# Patient Record
Sex: MALE | Race: WHITE | NOT HISPANIC OR LATINO | Employment: FULL TIME | URBAN - NONMETROPOLITAN AREA
[De-identification: names, ages, dates, MRNs, and addresses within clinical notes are randomized per-mention and may not be internally consistent; named-entity substitution may affect disease eponyms.]

---

## 2023-02-27 ENCOUNTER — TELEPHONE (OUTPATIENT)
Dept: PSYCHIATRY | Facility: CLINIC | Age: 28
End: 2023-02-27

## 2023-02-27 NOTE — TELEPHONE ENCOUNTER
Pt called asking for an apt because he is sucidule.  Informed pt that he need to go the the ER.  Pt decline still wanting an apt. So made an apt with Maile. Then pt asked to talk to Morteza.  Then Morteza transferred him back up here to schedule an apt with Radha.

## 2023-03-27 ENCOUNTER — OFFICE VISIT (OUTPATIENT)
Dept: PSYCHIATRY | Facility: CLINIC | Age: 28
End: 2023-03-27
Payer: COMMERCIAL

## 2023-03-27 DIAGNOSIS — F90.2 ATTENTION DEFICIT HYPERACTIVITY DISORDER, COMBINED TYPE: ICD-10-CM

## 2023-03-27 DIAGNOSIS — F31.81 BIPOLAR II DISORDER: Primary | ICD-10-CM

## 2023-03-27 DIAGNOSIS — F41.1 GENERALIZED ANXIETY DISORDER: ICD-10-CM

## 2023-03-27 PROCEDURE — 90791 PSYCH DIAGNOSTIC EVALUATION: CPT | Performed by: SOCIAL WORKER
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
## 2023-04-14 NOTE — PROGRESS NOTES
"Subjective   Lloyd Espana is a 27 y.o. male who presents today for initial evaluation     Chief Complaint:  Bipolar disorder, ADHD    History of Present Illness:   History of Present Illness  Today is an initial evaluation. He has long history of mental health treatment beginning at age 12 previously diagnosed with Asperger's syndrome, ADHD, anxiety and depression as well.. Currently taking wellbutrin  mg twice a day, prazosin 5 mg at night, Resalti 4 mg in am, Buspirone 30 mg twice a day, Klonopin 2 mg as needed, he reports he only takes it once or twice a month, clonidine 0.3 mg at night, Lamictal 25 mg twice a day. States has continued anxiety and depression. He was seen [facility], he started Lamictal about 2 months ago. Previous psychiatric hospitalizations: No Previous self harm behaviors: No. States he has had thoughts of harming himself and \"dreams\". Denies SI/HI/AVH.  Denies thoughts of self-harm. Prior medications include Lexapro, Vyvanse, Adderall (increases depression). He states he recently moved here, from [place], he was told he had bipoilar disorder type 2 when he was in [place].  He denies any risky behaviors including, drug or alcohol use, gambling, he enjoys shopping (even window shopping), denies risky sexual behaviors. He works at [employer] in [place], on leave of absence, for increased anxiety and depression. He states he is on short term disability until 5/10/23. He has previous diagnosis of cerebral palsy, he has been on light duty, he peels stickers \"all day\", increased depression, didn't want to be there, leaving work early. He is trying to find another job. Sleep is poor, he awakens during the night, but he has been sleeping mostly of the day, due to  working night shift, getting about 4 to 5 hours, denies nightmares, states Prazosin has controlled the nightmares. He states with sleeping during the daytime, he doesn't sleep through the night. Appetite is increased. " He overeats. He states he goes through periods of times when he doesn't eat much, but he states he mostly wants to eat all of the time. He states he doesn't feel like he feels emotions, difficulty crying. Denies SI/HI/AVH.  Denies thoughts of self-harm. He denies plan or intent to harm himself. Denies other health issues. Chronic health issues, no acute physical or medical issues today. He has one best friend in [place], is in contact every weekly. He has friends in [place] that he feels close to. He states it is scary when he has thoughts of self harm. Again, he denies any current thoughts of self harm, plan or intent to harm himself. He recently got his dog from [place], which he thinks has helped his moods, he got his spouse a 4 month old miniature schnauzer.Chronic health issues, no acute physical or medical issues today       The following portions of the patient's history were reviewed and updated as appropriate: allergies, current medications, past family history, past medical history, past social history, past surgical history and problem list.      Past Medical History:  Past Medical History:   Diagnosis Date   • ADHD (attention deficit hyperactivity disorder)    • Depression    • PTSD (post-traumatic stress disorder)        Social History:  Social History     Socioeconomic History   • Marital status:    Tobacco Use   • Smoking status: Never   Substance and Sexual Activity   • Alcohol use: Never   • Drug use: Never   • Sexual activity: Yes     Partners: Male     Birth control/protection: None       Family History:  Family History   Problem Relation Age of Onset   • Anxiety disorder Mother    • Depression Mother    • Depression Father        Past Surgical History:  History reviewed. No pertinent surgical history.    Problem List:  There is no problem list on file for this patient.      Allergy:   Allergies   Allergen Reactions   • Meloxicam Headache     migraines   • Doxycycline Rash        Current  "Medications:   Current Outpatient Medications   Medication Sig Dispense Refill   • Brexpiprazole (Rexulti) 4 MG tablet Take 1 tablet by mouth Daily. 30 tablet 1   • buPROPion XL (WELLBUTRIN XL) 300 MG 24 hr tablet Take 1 tablet by mouth Daily. 30 tablet 1   • busPIRone (BUSPAR) 30 MG tablet Take 1 tablet by mouth 2 (Two) Times a Day. 60 tablet 1   • cholecalciferol (VITAMIN D3) 1.25 MG (96766 UT) capsule Take 1 capsule by mouth Every 7 (Seven) Days. 4 capsule 11   • cloNIDine (CATAPRES) 0.2 MG tablet Take 1 tablet by mouth Every Night. 30 tablet 1   • hydrOXYzine (ATARAX) 25 MG tablet Take 1 tablet by mouth 3 (Three) Times a Day As Needed for Anxiety. 90 tablet 1   • lamoTRIgine (LaMICtal) 25 MG tablet Take 1 tablet by mouth 2 (Two) Times a Day. 60 tablet 1   • prazosin (MINIPRESS) 5 MG capsule Take 1 capsule by mouth Every Night. 30 capsule 1   • clonazePAM (KlonoPIN) 1 MG tablet Take 1 tablet by mouth Daily As Needed.     • ibuprofen (ADVIL,MOTRIN) 800 MG tablet Take 1 tablet by mouth Every 8 (Eight) Hours As Needed. for pain     • omeprazole (priLOSEC) 40 MG capsule        No current facility-administered medications for this visit.       Review of Symptoms:    Review of Systems   Neurological: Negative for seizures.   Psychiatric/Behavioral: Positive for dysphoric mood, sleep disturbance and depressed mood. Negative for agitation, behavioral problems, hallucinations, self-injury (No prior suicidal behaviors), suicidal ideas and stress. The patient is nervous/anxious.    All other systems reviewed and are negative.      Objective   Physical Exam:   Blood pressure 128/80, pulse 73, height 167.6 cm (66\"), weight 98.2 kg (216 lb 9.6 oz).  Body mass index is 34.96 kg/m².    Appearance:  male appears stated age, no acute distress noted.    Gait, Station, Strength: Steady, posture erect, WNL      Mental Status Exam: 4/17/23  Hygiene:   good  Cooperation:  Cooperative  Eye Contact:  Good  Psychomotor Behavior:  " Appropriate  Affect:  Appropriate  Mood: normal  Hopelessness: Denies  Speech:  Normal  Thought Process:  Linear  Thought Content:  Mood congruent  Suicidal:  None passive thoughts, denies plan or intent to harm himself  Homicidal:  None  Hallucinations:  None  Delusion:  None  Memory:  Intact  Orientation:  Person, Place, Time and Situation  Reliability:  fair  Insight:  Fair  Judgement:  Fair  Impulse Control:  Fair      PHQ-Score Total:  PHQ-9 Total Score: 12    Lab Results:   Lab on 04/17/2023   Component Date Value Ref Range Status   • Glucose 04/17/2023 81  65 - 99 mg/dL Final   • BUN 04/17/2023 17  6 - 20 mg/dL Final   • Creatinine 04/17/2023 1.04  0.76 - 1.27 mg/dL Final   • Sodium 04/17/2023 141  136 - 145 mmol/L Final   • Potassium 04/17/2023 4.3  3.5 - 5.2 mmol/L Final   • Chloride 04/17/2023 106  98 - 107 mmol/L Final   • CO2 04/17/2023 25.0  22.0 - 29.0 mmol/L Final   • Calcium 04/17/2023 9.1  8.6 - 10.5 mg/dL Final   • Total Protein 04/17/2023 7.1  6.0 - 8.5 g/dL Final   • Albumin 04/17/2023 4.6  3.5 - 5.2 g/dL Final   • ALT (SGPT) 04/17/2023 51 (H)  1 - 41 U/L Final   • AST (SGOT) 04/17/2023 27  1 - 40 U/L Final   • Alkaline Phosphatase 04/17/2023 67  39 - 117 U/L Final   • Total Bilirubin 04/17/2023 0.5  0.0 - 1.2 mg/dL Final   • Globulin 04/17/2023 2.5  gm/dL Final   • A/G Ratio 04/17/2023 1.8  g/dL Final   • BUN/Creatinine Ratio 04/17/2023 16.3  7.0 - 25.0 Final   • Anion Gap 04/17/2023 10.0  5.0 - 15.0 mmol/L Final   • eGFR 04/17/2023 101.6  >60.0 mL/min/1.73 Final   • Total Cholesterol 04/17/2023 253 (H)  0 - 200 mg/dL Final   • Triglycerides 04/17/2023 125  0 - 150 mg/dL Final   • HDL Cholesterol 04/17/2023 73 (H)  40 - 60 mg/dL Final   • LDL Cholesterol  04/17/2023 158 (H)  0 - 100 mg/dL Final   • VLDL Cholesterol 04/17/2023 22  5 - 40 mg/dL Final   • LDL/HDL Ratio 04/17/2023 2.12   Final   • TSH 04/17/2023 1.470  0.270 - 4.200 uIU/mL Final   • Free T4 04/17/2023 1.19  0.93 - 1.70 ng/dL Final    • Vitamin B-12 04/17/2023 439  211 - 946 pg/mL Final   • 25 Hydroxy, Vitamin D 04/17/2023 69.4  30.0 - 100.0 ng/ml Final   • WBC 04/17/2023 6.11  3.40 - 10.80 10*3/mm3 Final   • RBC 04/17/2023 5.79  4.14 - 5.80 10*6/mm3 Final   • Hemoglobin 04/17/2023 14.8  13.0 - 17.7 g/dL Final   • Hematocrit 04/17/2023 44.8  37.5 - 51.0 % Final   • MCV 04/17/2023 77.4 (L)  79.0 - 97.0 fL Final   • MCH 04/17/2023 25.6 (L)  26.6 - 33.0 pg Final   • MCHC 04/17/2023 33.0  31.5 - 35.7 g/dL Final   • RDW 04/17/2023 13.2  12.3 - 15.4 % Final   • RDW-SD 04/17/2023 36.8 (L)  37.0 - 54.0 fl Final   • MPV 04/17/2023 10.3  6.0 - 12.0 fL Final   • Platelets 04/17/2023 168  140 - 450 10*3/mm3 Final   • Neutrophil % 04/17/2023 52.9  42.7 - 76.0 % Final   • Lymphocyte % 04/17/2023 39.1  19.6 - 45.3 % Final   • Monocyte % 04/17/2023 7.2  5.0 - 12.0 % Final   • Eosinophil % 04/17/2023 0.3  0.3 - 6.2 % Final   • Basophil % 04/17/2023 0.2  0.0 - 1.5 % Final   • Immature Grans % 04/17/2023 0.3  0.0 - 0.5 % Final   • Neutrophils, Absolute 04/17/2023 3.23  1.70 - 7.00 10*3/mm3 Final   • Lymphocytes, Absolute 04/17/2023 2.39  0.70 - 3.10 10*3/mm3 Final   • Monocytes, Absolute 04/17/2023 0.44  0.10 - 0.90 10*3/mm3 Final   • Eosinophils, Absolute 04/17/2023 0.02  0.00 - 0.40 10*3/mm3 Final   • Basophils, Absolute 04/17/2023 0.01  0.00 - 0.20 10*3/mm3 Final   • Immature Grans, Absolute 04/17/2023 0.02  0.00 - 0.05 10*3/mm3 Final   • nRBC 04/17/2023 0.0  0.0 - 0.2 /100 WBC Final   Office Visit on 04/17/2023   Component Date Value Ref Range Status   • External Amphetamine Screen Urine 04/17/2023 Negative   Final   • External Benzodiazepine Screen Uri* 04/17/2023 Negative   Final   • External Cocaine Screen Urine 04/17/2023 Negative   Final   • External THC Screen Urine 04/17/2023 Negative   Final   • External Methadone Screen Urine 04/17/2023 Negative   Final   • External Methamphetamine Screen Ur* 04/17/2023 Negative   Final   • External Oxycodone Screen  Urine 04/17/2023 Negative   Final   • External Buprenorphine Screen Urine 04/17/2023 Negative   Final   • External MDMA 04/17/2023 Negative   Final   • External Opiates Screen Urine 04/17/2023 Negative   Final       Assessment & Plan   Problems Addressed this Visit    None  Visit Diagnoses     Bipolar II disorder    -  Primary    Relevant Medications    busPIRone (BUSPAR) 30 MG tablet    Brexpiprazole (Rexulti) 4 MG tablet    buPROPion XL (WELLBUTRIN XL) 300 MG 24 hr tablet    lamoTRIgine (LaMICtal) 25 MG tablet    hydrOXYzine (ATARAX) 25 MG tablet    Other Relevant Orders    CBC & Differential (Completed)    Comprehensive Metabolic Panel (Completed)    Lipid Panel (Completed)    TSH (Completed)    T4, Free (Completed)    Vitamin B12 (Completed)    Vitamin D,25-Hydroxy (Completed)    Attention deficit hyperactivity disorder, combined type        Relevant Medications    busPIRone (BUSPAR) 30 MG tablet    Brexpiprazole (Rexulti) 4 MG tablet    buPROPion XL (WELLBUTRIN XL) 300 MG 24 hr tablet    hydrOXYzine (ATARAX) 25 MG tablet    Other Relevant Orders    CBC & Differential (Completed)    Comprehensive Metabolic Panel (Completed)    Lipid Panel (Completed)    TSH (Completed)    T4, Free (Completed)    Vitamin B12 (Completed)    Vitamin D,25-Hydroxy (Completed)    Generalized anxiety disorder        Relevant Medications    cloNIDine (CATAPRES) 0.2 MG tablet    busPIRone (BUSPAR) 30 MG tablet    Brexpiprazole (Rexulti) 4 MG tablet    buPROPion XL (WELLBUTRIN XL) 300 MG 24 hr tablet    hydrOXYzine (ATARAX) 25 MG tablet    Other Relevant Orders    CBC & Differential (Completed)    Comprehensive Metabolic Panel (Completed)    Lipid Panel (Completed)    TSH (Completed)    T4, Free (Completed)    Vitamin B12 (Completed)    Vitamin D,25-Hydroxy (Completed)    Medication management        Relevant Orders    [facility] Drug Screen (Completed)    Nightmares        Relevant Medications    prazosin (MINIPRESS) 5 MG capsule     busPIRone (BUSPAR) 30 MG tablet    Brexpiprazole (Rexulti) 4 MG tablet    buPROPion XL (WELLBUTRIN XL) 300 MG 24 hr tablet    hydrOXYzine (ATARAX) 25 MG tablet      Diagnoses       Codes Comments    Bipolar II disorder    -  Primary ICD-10-CM: F31.81  ICD-9-CM: 296.89     Attention deficit hyperactivity disorder, combined type     ICD-10-CM: F90.2  ICD-9-CM: 314.01     Generalized anxiety disorder     ICD-10-CM: F41.1  ICD-9-CM: 300.02     Medication management     ICD-10-CM: Z79.899  ICD-9-CM: V58.69     Nightmares     ICD-10-CM: F51.5  ICD-9-CM: 307.47         Social History     Tobacco Use   Smoking Status Never   Smokeless Tobacco Not on file     MAINOR reviewed and appropriate. Patient counseled on use of controlled substances.       -The benefits of a healthy diet and exercise were discussed with patient, especially the positive effects they have on mental health. Patient encouraged to consider lifestyle modification regarding  diet and exercise patterns to maximize results of mental health treatment.  -Reviewed previous available documentation  -Reviewed most recent available labs     -This APRN has discussed that a very slow dose titration when starting, or changing doses, of lamotrigine may reduce the incidence of skin rash and other side effects.  The dosage should not be titrated upwards or increased faster than recommended due to the possibility of the discussed side effects and risk of development of a skin rash (which can become life threatening).  This APRN has also discussed that if the patient stops taking the lamotrigine for 5 days or longer, it will be necessary to restart the drug with an initial dose titration, as rashes have been reported on reexposure.  If the patient/guardian and Provider decide to stop the lamotrigine, the patient/guardian will follow the directions of this APRN/this office as a guided taper over about two weeks is appropriate due to the risk of relapse in bipolar disorder  with those with bipolar disorder, the risk of seizures in those with epilepsy, and discontinuation symptoms upon rapid discontinuation of lamotrigine. The patient/guardian verbalizes understanding of benefits and risks as discussed, the patient/guardian feels the benefits outweigh the risks and is agreeable to continue/take lamotrigine as discussed.  The patient/guardian is advised should any side effects or rash develops they are to stop the lamotrigine immediately and contact this APRN/this office or go to the emergency department immediately.  The patient/guardian verbalizes understanding and agreement with treatment plan in their own words.    -Prior medications include Lexapro, Vyvanse, Adderall (increases depression). Clonidine, lamictal, rexalti, wellbutrin, prazosin, buspar, klonopin.        Visit Diagnoses:    ICD-10-CM ICD-9-CM   1. Bipolar II disorder  F31.81 296.89   2. Attention deficit hyperactivity disorder, combined type  F90.2 314.01   3. Generalized anxiety disorder  F41.1 300.02   4. Medication management  Z79.899 V58.69   5. Nightmares  F51.5 307.47         TREATMENT PLAN/GOALS: Continue supportive psychotherapy efforts and medications as indicated. Treatment and medication options discussed during today's visit. Patient acknowledged and verbally consented to continue with current treatment plan and was educated on the importance of compliance with treatment and follow-up appointments.    MEDICATION ISSUES:  Discussed medication options and treatment plan of prescribed medication as well as the risks, benefits, and side effects including potential falls, possible impaired driving and metabolic adversities among others. Patient is agreeable to call the office with any worsening of symptoms or onset of side effects. Patient is agreeable to call 911 or go to the nearest ER should he/she begin having SI/HI.     MEDS ORDERED DURING VISIT:  New Medications Ordered This Visit   Medications   • prazosin  (MINIPRESS) 5 MG capsule     Sig: Take 1 capsule by mouth Every Night.     Dispense:  30 capsule     Refill:  1   • cloNIDine (CATAPRES) 0.2 MG tablet     Sig: Take 1 tablet by mouth Every Night.     Dispense:  30 tablet     Refill:  1   • busPIRone (BUSPAR) 30 MG tablet     Sig: Take 1 tablet by mouth 2 (Two) Times a Day.     Dispense:  60 tablet     Refill:  1   • Brexpiprazole (Rexulti) 4 MG tablet     Sig: Take 1 tablet by mouth Daily.     Dispense:  30 tablet     Refill:  1   • buPROPion XL (WELLBUTRIN XL) 300 MG 24 hr tablet     Sig: Take 1 tablet by mouth Daily.     Dispense:  30 tablet     Refill:  1   • lamoTRIgine (LaMICtal) 25 MG tablet     Sig: Take 1 tablet by mouth 2 (Two) Times a Day.     Dispense:  60 tablet     Refill:  1   • hydrOXYzine (ATARAX) 25 MG tablet     Sig: Take 1 tablet by mouth 3 (Three) Times a Day As Needed for Anxiety.     Dispense:  90 tablet     Refill:  1     Disregard previous hydroxyzine prescription       Return in about 2 months (around 6/17/2023).  -Continue buspirone 30 mg tablet take 1 tablet by mouth 2 times a day for anxiety  -Continue Rexulti 4 mg tablet take 1 tablet by mouth daily for depression and mood  -Change Wellbutrin to  mg 24-hour tablet take 1 tablet by mouth daily for depression  -Continue Lamictal 25 mg tablet take 1 tablet by mouth 2 times a day for mood  -Add hydroxyzine 25 mg tablet take 1 tablet up to 3 times a day as needed for anxiety  -Continue prazosin 5 mg capsule take 1 capsule at night for nightmares and PTSD  -Decrease clonidine 0.2 mg tablet take 1 tablet at night       Prognosis: Guarded dependent on medication/follow up and treatment plan compliance.  Functionality: pt showing improvements in important areas of daily functioning.     Short-term goals: Patient will adhere to medication regimen and note continued improvement in symptoms over the next 3 months.   Long-term goals: Patient will be adherent to medication management and  psychotherapy with continued improvement in symptoms over the next 6 months        This document has been electronically signed by PACHECO Ortiz   April 19, 2023 07:46 EDT    Part of this note may be an electronic transcription/translation of spoken language to printed text using the Dragon Dictation System.

## 2023-04-17 ENCOUNTER — LAB (OUTPATIENT)
Dept: LAB | Facility: HOSPITAL | Age: 28
End: 2023-04-17
Payer: COMMERCIAL

## 2023-04-17 ENCOUNTER — OFFICE VISIT (OUTPATIENT)
Dept: PSYCHIATRY | Facility: CLINIC | Age: 28
End: 2023-04-17
Payer: COMMERCIAL

## 2023-04-17 VITALS
SYSTOLIC BLOOD PRESSURE: 128 MMHG | BODY MASS INDEX: 34.81 KG/M2 | HEART RATE: 73 BPM | WEIGHT: 216.6 LBS | DIASTOLIC BLOOD PRESSURE: 80 MMHG | HEIGHT: 66 IN

## 2023-04-17 DIAGNOSIS — Z79.899 MEDICATION MANAGEMENT: ICD-10-CM

## 2023-04-17 DIAGNOSIS — F31.81 BIPOLAR II DISORDER: Primary | ICD-10-CM

## 2023-04-17 DIAGNOSIS — F41.1 GENERALIZED ANXIETY DISORDER: ICD-10-CM

## 2023-04-17 DIAGNOSIS — F90.2 ATTENTION DEFICIT HYPERACTIVITY DISORDER, COMBINED TYPE: ICD-10-CM

## 2023-04-17 DIAGNOSIS — F31.81 BIPOLAR II DISORDER: ICD-10-CM

## 2023-04-17 DIAGNOSIS — F51.5 NIGHTMARES: ICD-10-CM

## 2023-04-17 LAB
EXTERNAL AMPHETAMINE SCREEN URINE: NEGATIVE
EXTERNAL BENZODIAZEPINE SCREEN URINE: NEGATIVE
EXTERNAL BUPRENORPHINE SCREEN URINE: NEGATIVE
EXTERNAL COCAINE SCREEN URINE: NEGATIVE
EXTERNAL MDMA: NEGATIVE
EXTERNAL METHADONE SCREEN URINE: NEGATIVE
EXTERNAL METHAMPHETAMINE SCREEN URINE: NEGATIVE
EXTERNAL OPIATES SCREEN URINE: NEGATIVE
EXTERNAL OXYCODONE SCREEN URINE: NEGATIVE
EXTERNAL THC SCREEN URINE: NEGATIVE

## 2023-04-17 PROCEDURE — 84439 ASSAY OF FREE THYROXINE: CPT

## 2023-04-17 PROCEDURE — 80061 LIPID PANEL: CPT

## 2023-04-17 PROCEDURE — 82306 VITAMIN D 25 HYDROXY: CPT

## 2023-04-17 PROCEDURE — 82607 VITAMIN B-12: CPT

## 2023-04-17 PROCEDURE — 80050 GENERAL HEALTH PANEL: CPT

## 2023-04-17 RX ORDER — BUPROPION HYDROCHLORIDE 300 MG/1
300 TABLET ORAL DAILY
Qty: 30 TABLET | Refills: 1 | Status: SHIPPED | OUTPATIENT
Start: 2023-04-17

## 2023-04-17 RX ORDER — OMEPRAZOLE 40 MG/1
CAPSULE, DELAYED RELEASE ORAL
COMMUNITY
Start: 2023-02-15

## 2023-04-17 RX ORDER — PRAZOSIN HYDROCHLORIDE 5 MG/1
5 CAPSULE ORAL NIGHTLY
Qty: 30 CAPSULE | Refills: 1 | Status: SHIPPED | OUTPATIENT
Start: 2023-04-17

## 2023-04-17 RX ORDER — BUSPIRONE HYDROCHLORIDE 30 MG/1
30 TABLET ORAL 2 TIMES DAILY
Qty: 60 TABLET | Refills: 1 | Status: SHIPPED | OUTPATIENT
Start: 2023-04-17

## 2023-04-17 RX ORDER — HYDROXYZINE HYDROCHLORIDE 25 MG/1
25 TABLET, FILM COATED ORAL 3 TIMES DAILY PRN
Qty: 90 TABLET | Refills: 1 | Status: SHIPPED | OUTPATIENT
Start: 2023-04-17 | End: 2023-04-17

## 2023-04-17 RX ORDER — BUSPIRONE HYDROCHLORIDE 30 MG/1
TABLET ORAL
COMMUNITY
Start: 2023-03-14 | End: 2023-04-17 | Stop reason: SDUPTHER

## 2023-04-17 RX ORDER — BUPROPION HYDROCHLORIDE 150 MG/1
150 TABLET, EXTENDED RELEASE ORAL 2 TIMES DAILY
COMMUNITY
End: 2023-04-17

## 2023-04-17 RX ORDER — CLONIDINE HYDROCHLORIDE 0.2 MG/1
0.2 TABLET ORAL NIGHTLY
Qty: 30 TABLET | Refills: 1 | Status: SHIPPED | OUTPATIENT
Start: 2023-04-17

## 2023-04-17 RX ORDER — CLONIDINE HYDROCHLORIDE 0.3 MG/1
TABLET ORAL
COMMUNITY
Start: 2023-03-15 | End: 2023-04-17 | Stop reason: SDUPTHER

## 2023-04-17 RX ORDER — HYDROXYZINE HYDROCHLORIDE 25 MG/1
25 TABLET, FILM COATED ORAL 3 TIMES DAILY PRN
Qty: 90 TABLET | Refills: 1 | Status: SHIPPED | OUTPATIENT
Start: 2023-04-17

## 2023-04-17 RX ORDER — LAMOTRIGINE 25 MG/1
25 TABLET ORAL 2 TIMES DAILY
Qty: 60 TABLET | Refills: 1 | Status: SHIPPED | OUTPATIENT
Start: 2023-04-17

## 2023-04-17 RX ORDER — LAMOTRIGINE 25 MG/1
TABLET ORAL
COMMUNITY
Start: 2023-03-28 | End: 2023-04-17 | Stop reason: SDUPTHER

## 2023-04-17 RX ORDER — CLONAZEPAM 1 MG/1
1 TABLET ORAL DAILY PRN
COMMUNITY
Start: 2023-02-08

## 2023-04-17 RX ORDER — BREXPIPRAZOLE 4 MG/1
TABLET ORAL
COMMUNITY
Start: 2021-11-09 | End: 2023-04-17 | Stop reason: SDUPTHER

## 2023-04-17 RX ORDER — BREXPIPRAZOLE 4 MG/1
4 TABLET ORAL DAILY
Qty: 30 TABLET | Refills: 1 | Status: SHIPPED | OUTPATIENT
Start: 2023-04-17

## 2023-04-17 RX ORDER — PRAZOSIN HYDROCHLORIDE 5 MG/1
5 CAPSULE ORAL DAILY
COMMUNITY
Start: 2021-12-27 | End: 2023-04-17 | Stop reason: SDUPTHER

## 2023-04-17 RX ORDER — IBUPROFEN 800 MG/1
800 TABLET ORAL EVERY 8 HOURS PRN
COMMUNITY
Start: 2023-02-06

## 2023-04-17 RX ORDER — BUPROPION HYDROCHLORIDE 150 MG/1
150 TABLET ORAL DAILY
COMMUNITY
End: 2023-04-17 | Stop reason: SDUPTHER

## 2023-04-18 LAB
25(OH)D3 SERPL-MCNC: 69.4 NG/ML (ref 30–100)
ALBUMIN SERPL-MCNC: 4.6 G/DL (ref 3.5–5.2)
ALBUMIN/GLOB SERPL: 1.8 G/DL
ALP SERPL-CCNC: 67 U/L (ref 39–117)
ALT SERPL W P-5'-P-CCNC: 51 U/L (ref 1–41)
ANION GAP SERPL CALCULATED.3IONS-SCNC: 10 MMOL/L (ref 5–15)
AST SERPL-CCNC: 27 U/L (ref 1–40)
BASOPHILS # BLD AUTO: 0.01 10*3/MM3 (ref 0–0.2)
BASOPHILS NFR BLD AUTO: 0.2 % (ref 0–1.5)
BILIRUB SERPL-MCNC: 0.5 MG/DL (ref 0–1.2)
BUN SERPL-MCNC: 17 MG/DL (ref 6–20)
BUN/CREAT SERPL: 16.3 (ref 7–25)
CALCIUM SPEC-SCNC: 9.1 MG/DL (ref 8.6–10.5)
CHLORIDE SERPL-SCNC: 106 MMOL/L (ref 98–107)
CHOLEST SERPL-MCNC: 253 MG/DL (ref 0–200)
CO2 SERPL-SCNC: 25 MMOL/L (ref 22–29)
CREAT SERPL-MCNC: 1.04 MG/DL (ref 0.76–1.27)
DEPRECATED RDW RBC AUTO: 36.8 FL (ref 37–54)
EGFRCR SERPLBLD CKD-EPI 2021: 101.6 ML/MIN/1.73
EOSINOPHIL # BLD AUTO: 0.02 10*3/MM3 (ref 0–0.4)
EOSINOPHIL NFR BLD AUTO: 0.3 % (ref 0.3–6.2)
ERYTHROCYTE [DISTWIDTH] IN BLOOD BY AUTOMATED COUNT: 13.2 % (ref 12.3–15.4)
GLOBULIN UR ELPH-MCNC: 2.5 GM/DL
GLUCOSE SERPL-MCNC: 81 MG/DL (ref 65–99)
HCT VFR BLD AUTO: 44.8 % (ref 37.5–51)
HDLC SERPL-MCNC: 73 MG/DL (ref 40–60)
HGB BLD-MCNC: 14.8 G/DL (ref 13–17.7)
IMM GRANULOCYTES # BLD AUTO: 0.02 10*3/MM3 (ref 0–0.05)
IMM GRANULOCYTES NFR BLD AUTO: 0.3 % (ref 0–0.5)
LDLC SERPL CALC-MCNC: 158 MG/DL (ref 0–100)
LDLC/HDLC SERPL: 2.12 {RATIO}
LYMPHOCYTES # BLD AUTO: 2.39 10*3/MM3 (ref 0.7–3.1)
LYMPHOCYTES NFR BLD AUTO: 39.1 % (ref 19.6–45.3)
MCH RBC QN AUTO: 25.6 PG (ref 26.6–33)
MCHC RBC AUTO-ENTMCNC: 33 G/DL (ref 31.5–35.7)
MCV RBC AUTO: 77.4 FL (ref 79–97)
MONOCYTES # BLD AUTO: 0.44 10*3/MM3 (ref 0.1–0.9)
MONOCYTES NFR BLD AUTO: 7.2 % (ref 5–12)
NEUTROPHILS NFR BLD AUTO: 3.23 10*3/MM3 (ref 1.7–7)
NEUTROPHILS NFR BLD AUTO: 52.9 % (ref 42.7–76)
NRBC BLD AUTO-RTO: 0 /100 WBC (ref 0–0.2)
PLATELET # BLD AUTO: 168 10*3/MM3 (ref 140–450)
PMV BLD AUTO: 10.3 FL (ref 6–12)
POTASSIUM SERPL-SCNC: 4.3 MMOL/L (ref 3.5–5.2)
PROT SERPL-MCNC: 7.1 G/DL (ref 6–8.5)
RBC # BLD AUTO: 5.79 10*6/MM3 (ref 4.14–5.8)
SODIUM SERPL-SCNC: 141 MMOL/L (ref 136–145)
T4 FREE SERPL-MCNC: 1.19 NG/DL (ref 0.93–1.7)
TRIGL SERPL-MCNC: 125 MG/DL (ref 0–150)
TSH SERPL DL<=0.05 MIU/L-ACNC: 1.47 UIU/ML (ref 0.27–4.2)
VIT B12 BLD-MCNC: 439 PG/ML (ref 211–946)
VLDLC SERPL-MCNC: 22 MG/DL (ref 5–40)
WBC NRBC COR # BLD: 6.11 10*3/MM3 (ref 3.4–10.8)

## 2023-04-19 ENCOUNTER — TELEPHONE (OUTPATIENT)
Dept: PSYCHIATRY | Facility: CLINIC | Age: 28
End: 2023-04-19
Payer: COMMERCIAL

## 2023-04-19 NOTE — TELEPHONE ENCOUNTER
----- Message from PACHECO Ortiz sent at 4/19/2023  6:04 AM EDT -----  Would you notify patient, recommend patient f/u with PCP for further review of lab reports.     Thank you    ----- Message -----  From: Lab, Background User  Sent: 4/18/2023   2:31 AM EDT  To: PACHECO Ortiz

## 2023-04-19 NOTE — TELEPHONE ENCOUNTER
I did not get an answer or voicemail when attempting to contact patient. Letter was sent to address in chart.

## 2023-04-27 ENCOUNTER — TELEMEDICINE (OUTPATIENT)
Dept: PSYCHIATRY | Facility: CLINIC | Age: 28
End: 2023-04-27
Payer: COMMERCIAL

## 2023-04-27 DIAGNOSIS — F31.81 BIPOLAR II DISORDER: Primary | ICD-10-CM

## 2023-04-27 DIAGNOSIS — F90.2 ATTENTION DEFICIT HYPERACTIVITY DISORDER, COMBINED TYPE: ICD-10-CM

## 2023-04-27 DIAGNOSIS — F41.1 GENERALIZED ANXIETY DISORDER: ICD-10-CM

## 2023-04-27 PROCEDURE — 90834 PSYTX W PT 45 MINUTES: CPT | Performed by: SOCIAL WORKER

## 2023-04-27 NOTE — PROGRESS NOTES
Date: April 27, 2023  Time In: 12:08 pm  Time Out: 12:50 pm      This provider is located at [facility], [address] [place] [address]. The provider identified herself and credentials CELSO, COLBY . The Patient is seen remotely at home, using Epic Mychart. Patient is being seen via telehealth and stated they are in a secure environment for this session. The patient's condition being diagnosed/treated is appropriate for telemedicine. The provider identified himself as well as his credentials.   The patient gave consent to be seen remotely, and when consent is given they understand that the consent allows for patient identifiable information to be sent to a third party as needed.   They may refuse to be seen remotely at any time. The electronic data is encrypted and password protected, and the patient has been advised of the potential risks to privacy not withstanding such measures    PROGRESS NOTE    Chief Complaint: bipolar disorder, ADHD    Data:Lloyd is a 27 y.o., male who presents for individual therapy session at [facility]. Patient on time, clean and casually dressed  without evidence of intoxication, withdrawal, or perceptual disturbance.   Patient shares that he is lonely, noticing less energy when  is at work.  He shares feeling discouraged about his future, he is noticing no interest in things he used to enjoy.  Discusses that he feels worse when Yoel isnt around and feels alone, lonely.  The patient shares that their schedules don't allow them much time together.  He shares that financial concerns are also stressing him out. The patient shares that living on his own and having to be financially responsible is a huge stressor for him and he is trying to get things back on track without overspending. Has stopped the overspending and shopping.  Has noticed that he is eating out of boredom or going out to eat is something that he knows needs to work on. Patient shares  that his is worried that the accommodations are about to end and he is afraid that he wont be able to stand on his feet as long as will be required. Bipolar disorder more depressive symptoms no noticeable hypomania or manic symptoms.  Worries a lot, racing thougths at times  ADHD Adult inattention, low self-confidence, forgetfulness, avoiding mental tasks and fidgeting. Onset of symptoms was vague.  Symptoms are associated with financial burdens and lack of support.  Symptoms are aggravated by anxiety, lonely and stress.   Symptoms improve with medication management, therapy and personal self-care (wellness) Current rates severity of symptoms, on a scale of 1-10 (10 is the most severe) 7 Context Family and social history was reviewed  Quality been intermittent without a consistent pattern.    Clinical Maneuvering/Intervention:  Assisted patient in processing above session content; acknowledged and normalized patient’s thoughts, feelings, and concerns. Processing the above session content validating the patients expressions.  Exploring what the patient has been able to do in the past that assisted him to begin to problem solve and make a plan which he is able to do with therapeutic support. Introduced book ending and how to use to help him stay on task and not become over-whelemed.  While in the session the patient is walked through how to do this using the night stand which is view.  Patient is reminded to stay on 1 task at a time.  Continuing to build on the patients success with patient being able list the things that he needs to accomplish.  Explored physical activity verus laying in bed-noticing low motivation as a stop for the patient. Exploring eating patterns and   Therapist applied Brief Solutions Focused Therapy, CBT/REBT, Exploration of Coping Skills, Exploration of Emotions, Positive Coping Skills, Preventative Services and Structured Problem Solving and encouraged the patient to use positive coping  skills such as Playing with a pet, Spending time in nature, Reframe the way you are thinking about the problem, Self Care (Take care of your body in a way that makes you feel good - paint your nails, do your hair, put on a face mask), Time management (Work on managing your time better - for example, turn off the alerts on your phone), Establish healthy boundaries , Use positive self-talk, Keep a positive attitude, Take deep breaths, Keep calm by thinking and Utilize resources/coping skills.    Allowed patient to freely discuss issues without interruption or judgment. Provided safe, confidential environment to facilitate the development of positive therapeutic relationship and encourage open, honest communication. Assisted patient in identifying risk factors which would indicate the need for higher level of care including thoughts to harm self or others and/or self-harming behavior and encouraged patient to contact this office, call 911, or present to the nearest emergency room should any of these events occur. Discussed crisis intervention services and means to access. Patient adamantly and convincingly denies current suicidal or homicidal ideation or perceptual disturbance.    Assessment     Psychiatric/Behavioral: Negative for agitation, behavioral problems, decreased concentration, hallucinations, self-injury, sleep disturbance, suicidal ideas, negative for hyperactivity. Positive for dysphoric mood, depressed mood and stress. The patient is nervous/anxious.      Patient appears to maintain relative stability as compared to their baseline. However, patient continues to struggle with bipolar disorder, ADHD which continues to cause impairment in important areas of functioning. A result, they can be reasonably expected to continue to benefit from treatment and would likely be at increased risk for decompensation otherwise.    MENTAL STATUS EXAM   General Appearance:  Cleanly groomed and dressed  Eye Contact:  Good  "eye contact  Attitude:  Cooperative  Motor Activity:  Normal gait, posture  Muscle Strength:  Normal  Speech:  Normal rate, tone, volume  Language:  Spontaneous  Mood and affect:  Normal, pleasant  Hopelessness:  Denies  Thought Process:  Logical and goal-directed  Associations/ Thought Content:  No delusions  Hallucinations:  None  Suicidal Ideations:  Not present  Homicidal Ideation:  Not present  Sensorium:  Alert  Orientation:  Person, place, time and situation  Immediate Recall, Recent, and Remote Memory:  Intact  Attention Span/ Concentration:  Easily distracted  Fund of Knowledge:  Appropriate for age and educational level        Patient's Support Network Includes:   and extended family    Functional Status: Moderate impairment   STRENGTHS: Motivated for treatment, Literate, Good family support and Articulate  Overall: Anxious     WEAKNESSES: Poor social support and Poor coping skills  Progress toward goal: Not at goal    Prognosis: Guarded with Ongoing Treatment    Plan     PROGRESS TOWARD CURRENT PLAN OF CARE/TREATMENT PLAN:  Making Progress    SHORT-TERM GOALS: The patient will  will learn and practice at least 2 mood swing management techniques with goal of decreasing mood swings, will work with therapist to help expose and extinguish irrational beliefs and conclusions that contribute to anxiety/depression, will work with therapist to identify conflicts from the past and the present that form the basis, will initiate 2 or more social contacts per week for the next 4 weeks, will engage in one self-care activity daily, per self-report and will engage in one enjoyable activity daily, per self-report     LONG-TERM GOALS: With the help of therapy, I would like to: Remission of symptoms for 1 year and reports satisfaction with where he is at in his life.    Plan   Crisis Plan:  Symptoms and/or behaviors to indicate a crisis: Extreme mood changes; including uncontrollable \"highs\" or euphoria and Thinking " about suicide    What calming techniques or other strategies will patient use to de-esclate and stay safe: slow down, breathe, visualize calming self, think it though, listen to music, change focus, take a walk  Who is one person patient can contact to assist with de-escalation? [facility]    Crisis Management: the Patient will contact staff or crisis line if symptoms exacerbate or if harm to self or others becomes a concern. Crisis resources include: Crisis Line [phone], 911, Local Law Enforcement, [facility], [facility] 24/7 Emergency Room [phone].    Recommended Referrals: Psychiatrist/APRN and Medical Provider (PCP)  Patient will adhere to medication regimen as prescribed and report any side effects. Patient will contact this office, call 911 or present to the nearest emergency room should suicidal or homicidal ideations occur. Provide Cognitive Behavioral Therapy and Solution Focused Therapy to improve functioning, maintain stability, and avoid decompensation and the need for higher level of care.   Patient will continue in individual outpatient therapy with focus on improved functioning and coping skills, maintaining stability, and avoiding decompensation and the need for higher level of care.      Return in about 4 weeks, or earlier if symptoms worsen or fail to improve.    VISIT DIAGNOSIS:     ICD-10-CM ICD-9-CM   1. Bipolar II disorder  F31.81 296.89   2. Attention deficit hyperactivity disorder, combined type  F90.2 314.01   3. Generalized anxiety disorder  F41.1 300.02        Future Appointments       Provider Department Center    5/1/2023 2:00 PM Radha Francisco LCSW [facility]    6/19/2023 12:45 PM Maile Sanchez APRN [facility]            This document has been electronically signed by Radha Ball LCSW, COLBY   April 27, 2023 12:10 EDT      Part of this note may be an electronic  transcription/translation of spoken language to printed text using the Dragon Dictation System.

## 2023-05-01 ENCOUNTER — TELEMEDICINE (OUTPATIENT)
Dept: PSYCHIATRY | Facility: CLINIC | Age: 28
End: 2023-05-01
Payer: COMMERCIAL

## 2023-05-01 DIAGNOSIS — F90.2 ATTENTION DEFICIT HYPERACTIVITY DISORDER, COMBINED TYPE: ICD-10-CM

## 2023-05-01 DIAGNOSIS — F31.81 BIPOLAR II DISORDER: Primary | ICD-10-CM

## 2023-05-01 DIAGNOSIS — F41.1 GENERALIZED ANXIETY DISORDER: ICD-10-CM

## 2023-05-01 PROCEDURE — 90837 PSYTX W PT 60 MINUTES: CPT | Performed by: SOCIAL WORKER
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
## 2023-05-10 ENCOUNTER — OFFICE VISIT (OUTPATIENT)
Dept: PSYCHIATRY | Facility: CLINIC | Age: 28
End: 2023-05-10
Payer: COMMERCIAL

## 2023-05-10 DIAGNOSIS — F43.23 ADJUSTMENT DISORDER WITH MIXED ANXIETY AND DEPRESSED MOOD: Primary | ICD-10-CM
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
## 2023-05-15 ENCOUNTER — OFFICE VISIT (OUTPATIENT)
Dept: PSYCHIATRY | Facility: HOSPITAL | Age: 28
End: 2023-05-15
Payer: COMMERCIAL

## 2023-05-15 DIAGNOSIS — F43.10 POST TRAUMATIC STRESS DISORDER (PTSD): ICD-10-CM

## 2023-05-15 DIAGNOSIS — F33.2 SEVERE EPISODE OF RECURRENT MAJOR DEPRESSIVE DISORDER, WITHOUT PSYCHOTIC FEATURES: Primary | ICD-10-CM

## 2023-05-15 PROCEDURE — S9480 INTENSIVE OUTPATIENT PSYCHIA: HCPCS

## 2023-05-16 ENCOUNTER — OFFICE VISIT (OUTPATIENT)
Dept: PSYCHIATRY | Facility: HOSPITAL | Age: 28
End: 2023-05-16
Payer: COMMERCIAL

## 2023-05-16 DIAGNOSIS — F33.2 SEVERE EPISODE OF RECURRENT MAJOR DEPRESSIVE DISORDER, WITHOUT PSYCHOTIC FEATURES: Primary | ICD-10-CM

## 2023-05-16 DIAGNOSIS — F43.10 POST TRAUMATIC STRESS DISORDER (PTSD): ICD-10-CM

## 2023-05-16 PROCEDURE — S9480 INTENSIVE OUTPATIENT PSYCHIA: HCPCS

## 2023-05-17 ENCOUNTER — OFFICE VISIT (OUTPATIENT)
Dept: PSYCHIATRY | Facility: HOSPITAL | Age: 28
End: 2023-05-17
Payer: COMMERCIAL

## 2023-05-17 DIAGNOSIS — F43.10 POST TRAUMATIC STRESS DISORDER (PTSD): ICD-10-CM

## 2023-05-17 DIAGNOSIS — F33.2 SEVERE EPISODE OF RECURRENT MAJOR DEPRESSIVE DISORDER, WITHOUT PSYCHOTIC FEATURES: Primary | ICD-10-CM

## 2023-05-17 PROCEDURE — S9480 INTENSIVE OUTPATIENT PSYCHIA: HCPCS

## 2023-05-18 ENCOUNTER — OFFICE VISIT (OUTPATIENT)
Dept: PSYCHIATRY | Facility: HOSPITAL | Age: 28
End: 2023-05-18
Payer: COMMERCIAL

## 2023-05-18 DIAGNOSIS — F41.1 GENERALIZED ANXIETY DISORDER: ICD-10-CM

## 2023-05-18 DIAGNOSIS — F31.81 BIPOLAR II DISORDER: ICD-10-CM

## 2023-05-18 RX ORDER — LAMOTRIGINE 25 MG/1
50 TABLET ORAL 2 TIMES DAILY
Qty: 60 TABLET | Refills: 0 | Status: SHIPPED | OUTPATIENT
Start: 2023-05-18

## 2023-05-18 RX ORDER — BUPROPION HYDROCHLORIDE 300 MG/1
300 TABLET ORAL EVERY MORNING
Qty: 30 TABLET | Refills: 0 | Status: SHIPPED | OUTPATIENT
Start: 2023-05-18
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
## 2023-05-23 ENCOUNTER — OFFICE VISIT (OUTPATIENT)
Dept: PSYCHIATRY | Facility: HOSPITAL | Age: 28
End: 2023-05-23
Payer: COMMERCIAL

## 2023-05-23 DIAGNOSIS — F43.10 POST TRAUMATIC STRESS DISORDER (PTSD): ICD-10-CM

## 2023-05-23 DIAGNOSIS — F33.2 SEVERE EPISODE OF RECURRENT MAJOR DEPRESSIVE DISORDER, WITHOUT PSYCHOTIC FEATURES: Primary | ICD-10-CM

## 2023-05-23 PROCEDURE — S9480 INTENSIVE OUTPATIENT PSYCHIA: HCPCS

## 2023-05-24 ENCOUNTER — APPOINTMENT (OUTPATIENT)
Dept: PSYCHIATRY | Facility: HOSPITAL | Age: 28
End: 2023-05-24
Payer: COMMERCIAL

## 2023-05-24 DIAGNOSIS — F33.2 SEVERE EPISODE OF RECURRENT MAJOR DEPRESSIVE DISORDER, WITHOUT PSYCHOTIC FEATURES: Primary | ICD-10-CM

## 2023-05-24 DIAGNOSIS — F43.10 POST TRAUMATIC STRESS DISORDER (PTSD): ICD-10-CM

## 2023-05-24 PROCEDURE — S9480 INTENSIVE OUTPATIENT PSYCHIA: HCPCS

## 2023-05-24 NOTE — PROGRESS NOTES
NAME: Lloyd Espana  DATE: 05/15/2023    -- [facility] --      Time:   5104-9577        DATA:          Psychotherapy Group (Check-in):  Therapist asked that each patient share a summary of how they're doing, including progress towards therapy goals and any new stressors that may have occurred, as well as any items they wish to put on today's agenda. Therapist provided empathy and support.      Education/Training Group: Group members received education about suicide bereavement. Therapist encouraged group members to share their thoughts and discuss this topic with one another. Therapist continued facilitation of group cohesiveness.      Psychotherapy Group: This  group focused on further exploring topics mentioned at check ins. Patients are given opportunities to build interpersonal confidence, practice communication skills, and receive empathic understanding from others. Therapist encourages group members to share and respond to one another about their experience with this.     Patient Response:     Personal Assessment 0-10 Scale (10 worst)   Depression: 7  Anxiety: 7    Patient arrived in person and participated in therapy today. Patient shared that he had a stressful weekend due to having his step-son over the house. States that having the child around disrupted patient’s routine and frustrated patient. Patient is engaged during educational group session about suicide bereavement.     ASSESSMENT:    MSE within normal limits.     Impression/Formulation:    ICD-10-CM ICD-9-CM   1. Severe episode of recurrent major depressive disorder, without psychotic features  F33.2 296.33   2. Post traumatic stress disorder (PTSD)  F43.10 309.81       CLINICAL MANUVERING/INTERVENTIONS:  Therapist utilized a person-centered approach to build rapport with patient.  Therapist implemented motivational interviewing techniques to assist patient with exploring personal growth and change.   Therapist applied cognitive behavioral  strategies to facilitate identification of maladaptive patterns of thinking and behavior.  Therapist employed group interaction activities to build rapport among group members, promote healthy lifestyle, and emphasize improvement of symptoms.  Therapist promoted safe nonjudgmental environment by providing group members with unconditional positive regard and encouraging group members to comply with group rules and guidelines.  Therapist utilized dialectical behavior techniques to teach and model emotional regulation and relaxation.  Therapist assisted group member with identifying and implementing healthier coping strategies.  Group member was encouraged to make positive daily choices, and maintain healthy boundaries. Group format provides experiential learning of the benefit of sharing openly with others.     PLAN:  Continue [facility].  Aftercare:  Step down to outpatient level of care     This document signed by YUVAL Bennett, May 24, 2023, 11:52 EDT

## 2023-05-25 ENCOUNTER — APPOINTMENT (OUTPATIENT)
Dept: PSYCHIATRY | Facility: HOSPITAL | Age: 28
End: 2023-05-25
Payer: COMMERCIAL

## 2023-05-25 DIAGNOSIS — F33.2 SEVERE EPISODE OF RECURRENT MAJOR DEPRESSIVE DISORDER, WITHOUT PSYCHOTIC FEATURES: Primary | ICD-10-CM

## 2023-05-25 DIAGNOSIS — F43.10 POST TRAUMATIC STRESS DISORDER (PTSD): ICD-10-CM

## 2023-05-25 PROCEDURE — S9480 INTENSIVE OUTPATIENT PSYCHIA: HCPCS

## 2023-05-26 NOTE — PROGRESS NOTES
NAME: Lloyd Espana  DATE: 05/16/2023    -- [facility] --      Time:   5973-0333        DATA:          Psychotherapy Group (Check-in):  Therapist asked that each patient share a summary of how they're doing, including progress towards therapy goals and any new stressors that may have occurred, as well as any items they wish to put on today's agenda. Therapist provided empathy and support.      Psychotherapy Group: This  group focused on further exploring topics mentioned at check ins. Patients are given opportunities to build interpersonal confidence, practice communication skills, and receive empathic understanding from others. Therapist encourages group members to share and respond to one another about their experience with this.       Education/Training Group: Group members [facility] from [facility].      Patient Response:     Personal Assessment 0-10 Scale (10 worst)   Depression: 5  Anxiety: 9    Patient arrived in person and participated in therapy today. Patient shared that he is feeling anxious about his FMLA today, getting it in time. Patient is engaged in group conversations today.     ASSESSMENT:    MSE within normal limits.    Impression/Formulation:    ICD-10-CM ICD-9-CM   1. Severe episode of recurrent major depressive disorder, without psychotic features  F33.2 296.33   2. Post traumatic stress disorder (PTSD)  F43.10 309.81       CLINICAL MANUVERING/INTERVENTIONS:  Therapist utilized a person-centered approach to build rapport with patient.  Therapist implemented motivational interviewing techniques to assist patient with exploring personal growth and change.   Therapist applied cognitive behavioral strategies to facilitate identification of maladaptive patterns of thinking and behavior.  Therapist employed group interaction activities to build rapport among group members, promote healthy lifestyle, and emphasize improvement of symptoms.  Therapist promoted safe nonjudgmental  environment by providing group members with unconditional positive regard and encouraging group members to comply with group rules and guidelines.  Therapist utilized dialectical behavior techniques to teach and model emotional regulation and relaxation.  Therapist assisted group member with identifying and implementing healthier coping strategies.  Group member was encouraged to make positive daily choices, and maintain healthy boundaries. Group format provides experiential learning of the benefit of sharing openly with others.     PLAN:  Continue [facility].  Aftercare:  Step down to outpatient level of care     This document signed by YUVAL Bennett, May 26, 2023, 14:00 EDT

## 2023-05-26 NOTE — PROGRESS NOTES
NAME: Lloyd Espana  DATE: 05/17/2023    -- [facility] --     Time:   9567-9362      DATA:          Psychotherapy Group (Check-in):  Therapist asked that each patient share a summary of how they're doing, including progress towards therapy goals and any new stressors that may have occurred, as well as any items they wish to put on today's agenda. Therapist provided empathy and support.     Psychotherapy Group: This  group focused on further exploring topics mentioned at check ins. Patients are given opportunities to build interpersonal confidence, practice communication skills, and receive empathic understanding from others. Therapist encourages group members to share and respond to one another about their experience with this.      Activity Therapy Group: Group members received activity therapy from RT Rachel.     Patient Response:     Personal Assessment 0-10 Scale (10 worst)   Depression: 5  Anxiety: 6    Patient arrived in person and participated in therapy today. Patient shared reports feeling somewhat better but still feeling stressed. Patient relates to attachment-related anxiety that another group member shared about. Patient states that he often finds himself clinging to others by “trying to talk constantly.”      ASSESSMENT:    MSE within normal limits.     Impression/Formulation:    ICD-10-CM ICD-9-CM   1. Severe episode of recurrent major depressive disorder, without psychotic features  F33.2 296.33   2. Post traumatic stress disorder (PTSD)  F43.10 309.81       CLINICAL MANUVERING/INTERVENTIONS:  Therapist utilized a person-centered approach to build rapport with patient.  Therapist implemented motivational interviewing techniques to assist patient with exploring personal growth and change.   Therapist applied cognitive behavioral strategies to facilitate identification of maladaptive patterns of thinking and behavior.  Therapist employed group interaction activities to build rapport among  group members, promote healthy lifestyle, and emphasize improvement of symptoms.  Therapist promoted safe nonjudgmental environment by providing group members with unconditional positive regard and encouraging group members to comply with group rules and guidelines.  Therapist utilized dialectical behavior techniques to teach and model emotional regulation and relaxation.  Therapist assisted group member with identifying and implementing healthier coping strategies.  Group member was encouraged to make positive daily choices, and maintain healthy boundaries. Group format provides experiential learning of the benefit of sharing openly with others.     PLAN:  Continue [facility].  Aftercare:  Step down to outpatient level of care     This document signed by YUVAL Bennett, May 26, 2023, 15:05 EDT

## 2023-05-30 ENCOUNTER — OFFICE VISIT (OUTPATIENT)
Dept: PSYCHIATRY | Facility: HOSPITAL | Age: 28
End: 2023-05-30
Payer: COMMERCIAL

## 2023-05-30 DIAGNOSIS — F33.2 SEVERE EPISODE OF RECURRENT MAJOR DEPRESSIVE DISORDER, WITHOUT PSYCHOTIC FEATURES: Primary | ICD-10-CM

## 2023-05-30 DIAGNOSIS — F43.10 POST TRAUMATIC STRESS DISORDER (PTSD): ICD-10-CM

## 2023-05-30 PROCEDURE — S9480 INTENSIVE OUTPATIENT PSYCHIA: HCPCS

## 2023-05-31 ENCOUNTER — APPOINTMENT (OUTPATIENT)
Dept: PSYCHIATRY | Facility: HOSPITAL | Age: 28
End: 2023-05-31
Payer: COMMERCIAL

## 2023-06-01 ENCOUNTER — OFFICE VISIT (OUTPATIENT)
Dept: PSYCHIATRY | Facility: HOSPITAL | Age: 28
End: 2023-06-01
Payer: COMMERCIAL

## 2023-06-01 ENCOUNTER — OFFICE VISIT (OUTPATIENT)
Dept: PSYCHIATRY | Facility: CLINIC | Age: 28
End: 2023-06-01
Payer: COMMERCIAL

## 2023-06-01 DIAGNOSIS — F43.10 POST TRAUMATIC STRESS DISORDER (PTSD): ICD-10-CM

## 2023-06-01 DIAGNOSIS — F31.81 BIPOLAR II DISORDER: ICD-10-CM

## 2023-06-01 DIAGNOSIS — F41.1 GENERALIZED ANXIETY DISORDER: ICD-10-CM

## 2023-06-01 DIAGNOSIS — F33.2 SEVERE EPISODE OF RECURRENT MAJOR DEPRESSIVE DISORDER, WITHOUT PSYCHOTIC FEATURES: Primary | ICD-10-CM

## 2023-06-01 RX ORDER — LAMOTRIGINE 100 MG/1
50 TABLET ORAL 2 TIMES DAILY
Qty: 30 TABLET | Refills: 0 | Status: SHIPPED | OUTPATIENT
Start: 2023-06-01
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
## 2023-06-02 NOTE — PROGRESS NOTES
Adult PHP Group      Date: March 30, 2023    Time: 3:00 pm - 4:00 pm    Type of Group:  Psychoeducation    Group  Content: Gratitude exercises    Patient Response:Group participants received education on six different gratitude exercises.  Gratitude journaling, giving thanks, mindfulness walk, gratitude letter, grateful contemplation, and gratitude conversation.  Group participants discussed these different exercises and were encouraged to practice them in their daily lives.         Morteza Coleman  06/02/23  11:53 EDT

## 2023-06-02 NOTE — PROGRESS NOTES
NAME: Lloyd Espana  DATE: 05/23/2023    -- [facility] --      Time:   0720-7395        DATA:          Psychotherapy Group (Check-in):  Therapist asked that each patient share a summary of how they're doing, including progress towards therapy goals and any new stressors that may have occurred, as well as any items they wish to put on today's agenda. Therapist provided empathy and support.      Psychotherapy Group: This  group focused on further exploring topics mentioned at check ins. Patients are given opportunities to build interpersonal confidence, practice communication skills, and receive empathic understanding from others. Therapist encourages group members to share and respond to one another about their experience with this.      Psychotherapy Group: This  group focused on further exploring topics mentioned at check ins. Patients are given opportunities to build interpersonal confidence, practice communication skills, and receive empathic understanding from others. Therapist encourages group members to share and respond to one another about their experience with this.     Patient Response:     Personal Assessment 0-10 Scale (10 worst)   Depression: 4  Anxiety: 10    Patient arrived in person and participated in therapy today. Patient shared that he is feeling stressed. States that he had an intentional conversation with his spouse that went well. States that he wants to learn some skills for controlling his anger so he doesn’t yell at his spouse.     ASSESSMENT:    Impression/Formulation:    ICD-10-CM ICD-9-CM   1. Severe episode of recurrent major depressive disorder, without psychotic features  F33.2 296.33   2. Post traumatic stress disorder (PTSD)  F43.10 309.81       CLINICAL MANUVERING/INTERVENTIONS:  Therapist utilized a person-centered approach to build rapport with patient.  Therapist implemented motivational interviewing techniques to assist patient with exploring personal growth and change.    Therapist applied cognitive behavioral strategies to facilitate identification of maladaptive patterns of thinking and behavior.  Therapist employed group interaction activities to build rapport among group members, promote healthy lifestyle, and emphasize improvement of symptoms.  Therapist promoted safe nonjudgmental environment by providing group members with unconditional positive regard and encouraging group members to comply with group rules and guidelines.  Therapist utilized dialectical behavior techniques to teach and model emotional regulation and relaxation.  Therapist assisted group member with identifying and implementing healthier coping strategies.  Group member was encouraged to make positive daily choices, and maintain healthy boundaries. Group format provides experiential learning of the benefit of sharing openly with others.     PLAN:  Continue [facility].  Aftercare:  Step down to outpatient level of care     This document signed by YUVAL Bennett, June 2, 2023, 14:26 EDT

## 2023-06-05 ENCOUNTER — OFFICE VISIT (OUTPATIENT)
Dept: PSYCHIATRY | Facility: HOSPITAL | Age: 28
End: 2023-06-05
Payer: COMMERCIAL

## 2023-06-05 DIAGNOSIS — F43.10 POST TRAUMATIC STRESS DISORDER (PTSD): Primary | ICD-10-CM

## 2023-06-05 DIAGNOSIS — F41.1 GENERALIZED ANXIETY DISORDER: ICD-10-CM

## 2023-06-05 DIAGNOSIS — F31.81 BIPOLAR II DISORDER: ICD-10-CM

## 2023-06-05 PROCEDURE — S9480 INTENSIVE OUTPATIENT PSYCHIA: HCPCS

## 2023-06-05 NOTE — PROGRESS NOTES
NAME: Lloyd Espana  DATE: 05/24/2023    -- [facility] --      Time:   9182-2663        DATA:          Psychotherapy Group (Check-in):  Therapist asked that each patient share a summary of how they're doing, including progress towards therapy goals and any new stressors that may have occurred, as well as any items they wish to put on today's agenda. Therapist provided empathy and support.      Psychotherapy Group: This  group focused on further exploring topics mentioned at check ins. Patients are given opportunities to build interpersonal confidence, practice communication skills, and receive empathic understanding from others. Therapist encourages group members to share and respond to one another about their experience with this.      Activity Therapy Group: Group members received activity therapy from RT Rachel.     Patient Response:     Personal Assessment 0-10 Scale (10 worst)   Depression: 5  Anxiety: 5    Patient arrived in person and participated in therapy today. Patient shared that things seem to be going a bit better. Reports feeling hopeful because he was able to recover from a panic attack quicker than usual by using breathing exercise and grounding exercise he learned in [facility].     ASSESSMENT:        Impression/Formulation:    ICD-10-CM ICD-9-CM   1. Severe episode of recurrent major depressive disorder, without psychotic features  F33.2 296.33   2. Post traumatic stress disorder (PTSD)  F43.10 309.81       CLINICAL MANUVERING/INTERVENTIONS:  Therapist utilized a person-centered approach to build rapport with patient.  Therapist implemented motivational interviewing techniques to assist patient with exploring personal growth and change.   Therapist applied cognitive behavioral strategies to facilitate identification of maladaptive patterns of thinking and behavior.  Therapist employed group interaction activities to build rapport among group members, promote healthy lifestyle, and emphasize  improvement of symptoms.  Therapist promoted safe nonjudgmental environment by providing group members with unconditional positive regard and encouraging group members to comply with group rules and guidelines.  Therapist utilized dialectical behavior techniques to teach and model emotional regulation and relaxation.  Therapist assisted group member with identifying and implementing healthier coping strategies.  Group member was encouraged to make positive daily choices, and maintain healthy boundaries. Group format provides experiential learning of the benefit of sharing openly with others.     PLAN:  Continue [facility].  Aftercare:  Step down to outpatient level of care     This document signed by Chantelle Lux [facility], June 5, 2023, 11:00 EDT

## 2023-06-05 NOTE — PROGRESS NOTES
"     NAME: Lloyd Espana  DATE: 05/25/2023    -- [facility] --     Time:   1920-6374      DATA:          Psychotherapy Group (Check-in):  Therapist asked that each patient share a summary of how they're doing, including progress towards therapy goals and any new stressors that may have occurred, as well as any items they wish to put on today's agenda. Therapist provided empathy and support.     Education/Training Group: Group members received education about \"I statements\" as communication skills. Therapist encouraged group members to share their thoughts and discuss this topic with one another. Therapist continued facilitation of group cohesiveness.     Psychotherapy Group: This  group focused on further exploring topics mentioned at check ins. Patients are given opportunities to build interpersonal confidence, practice communication skills, and receive empathic understanding from others. Therapist encourages group members to share and respond to one another about their experience with this.     Patient Response:     Personal Assessment 0-10 Scale (10 worst)   Depression: 5  Anxiety: 8    Patient arrived in person and participated in therapy today. Patient shared that he is feeling better today. Patient gives advice to other group members a few times.     ASSESSMENT:    Impression/Formulation:    ICD-10-CM ICD-9-CM   1. Severe episode of recurrent major depressive disorder, without psychotic features  F33.2 296.33   2. Post traumatic stress disorder (PTSD)  F43.10 309.81       CLINICAL MANUVERING/INTERVENTIONS:  Therapist utilized a person-centered approach to build rapport with patient.  Therapist implemented motivational interviewing techniques to assist patient with exploring personal growth and change.   Therapist applied cognitive behavioral strategies to facilitate identification of maladaptive patterns of thinking and behavior.  Therapist employed group interaction activities to build rapport among group " members, promote healthy lifestyle, and emphasize improvement of symptoms.  Therapist promoted safe nonjudgmental environment by providing group members with unconditional positive regard and encouraging group members to comply with group rules and guidelines.  Therapist utilized dialectical behavior techniques to teach and model emotional regulation and relaxation.  Therapist assisted group member with identifying and implementing healthier coping strategies.  Group member was encouraged to make positive daily choices, and maintain healthy boundaries. Group format provides experiential learning of the benefit of sharing openly with others.     PLAN:  Continue [facility].  Aftercare:  Step down to outpatient level of care     This document signed by Chantelle Lux [facility], June 5, 2023, 12:38 EDT

## 2023-06-06 ENCOUNTER — OFFICE VISIT (OUTPATIENT)
Dept: PSYCHIATRY | Facility: HOSPITAL | Age: 28
End: 2023-06-06
Payer: COMMERCIAL

## 2023-06-06 DIAGNOSIS — F33.2 SEVERE EPISODE OF RECURRENT MAJOR DEPRESSIVE DISORDER, WITHOUT PSYCHOTIC FEATURES: Primary | ICD-10-CM

## 2023-06-06 DIAGNOSIS — F43.10 POST TRAUMATIC STRESS DISORDER (PTSD): ICD-10-CM

## 2023-06-06 PROCEDURE — S9480 INTENSIVE OUTPATIENT PSYCHIA: HCPCS

## 2023-06-07 ENCOUNTER — OFFICE VISIT (OUTPATIENT)
Dept: PSYCHIATRY | Facility: HOSPITAL | Age: 28
End: 2023-06-07
Payer: COMMERCIAL

## 2023-06-07 DIAGNOSIS — F41.1 GENERALIZED ANXIETY DISORDER: ICD-10-CM

## 2023-06-07 DIAGNOSIS — F43.10 POST TRAUMATIC STRESS DISORDER (PTSD): ICD-10-CM

## 2023-06-07 DIAGNOSIS — F33.2 SEVERE EPISODE OF RECURRENT MAJOR DEPRESSIVE DISORDER, WITHOUT PSYCHOTIC FEATURES: Primary | ICD-10-CM

## 2023-06-07 PROCEDURE — S9480 INTENSIVE OUTPATIENT PSYCHIA: HCPCS

## 2023-06-07 NOTE — PROGRESS NOTES
NAME: Lloyd Espana  DATE: 05/30/2023    -- [facility] --      Time:   0686-5891        DATA:          Psychotherapy Group (Check-in):  Therapist asked that each patient share a summary of how they're doing, including progress towards therapy goals and any new stressors that may have occurred, as well as any items they wish to put on today's agenda. Therapist provided empathy and support.      Psychotherapy Group: This  group focused on further exploring topics mentioned at check ins. Patients are given opportunities to build interpersonal confidence, practice communication skills, and receive empathic understanding from others. Therapist encourages group members to share and respond to one another about their experience with this.      Education/Training Group: Group members [facility].    Patient Response:     Personal Assessment 0-10 Scale (10 worst)   Depression: 8  Anxiety: 10    Patient arrived in person and participated in therapy today. Patient shared that he is feeling severe anxiety and depression today due to worrying because his work has yet to accept his paid leave. Patient processes this with group and therapist.     ASSESSMENT:      Impression/Formulation:    ICD-10-CM ICD-9-CM   1. Severe episode of recurrent major depressive disorder, without psychotic features  F33.2 296.33   2. Post traumatic stress disorder (PTSD)  F43.10 309.81       CLINICAL MANUVERING/INTERVENTIONS:  Therapist utilized a person-centered approach to build rapport with patient.  Therapist implemented motivational interviewing techniques to assist patient with exploring personal growth and change.   Therapist applied cognitive behavioral strategies to facilitate identification of maladaptive patterns of thinking and behavior.  Therapist employed group interaction activities to build rapport among group members, promote healthy lifestyle, and emphasize improvement of symptoms.  Therapist promoted  safe nonjudgmental environment by providing group members with unconditional positive regard and encouraging group members to comply with group rules and guidelines.  Therapist utilized dialectical behavior techniques to teach and model emotional regulation and relaxation.  Therapist assisted group member with identifying and implementing healthier coping strategies.  Group member was encouraged to make positive daily choices, and maintain healthy boundaries. Group format provides experiential learning of the benefit of sharing openly with others.     PLAN:  Continue [facility].  Aftercare:  Step down to outpatient level of care     This document signed by YUVAL Bennett, June 7, 2023, 10:15 EDT
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
## 2023-06-09 NOTE — PROGRESS NOTES
NAME: Lloyd Espana  DATE: 06/06/2023    -- [facility] --      Time:   5041-5032        DATA:          Psychotherapy Group (Check-in):  Therapist asked that each patient share a summary of how they're doing, including progress towards therapy goals and any new stressors that may have occurred, as well as any items they wish to put on today's agenda. Therapist provided empathy and support.      Education/Training Group: Group members received education about cognitive distortions. Therapist encouraged group members to share their thoughts and discuss this topic with one another. Therapist continued facilitation of group cohesiveness.      Education/Training Group: Group members received education from Morteza Coleman [facility].     Patient Response:      Patient arrived in person and participated in therapy today. Patient participated in educational group session about cognitive distortions. Patient practices social skills by responding to other people's shares.     ASSESSMENT:    Impression/Formulation:    ICD-10-CM ICD-9-CM   1. Severe episode of recurrent major depressive disorder, without psychotic features  F33.2 296.33   2. Post traumatic stress disorder (PTSD)  F43.10 309.81       CLINICAL MANUVERING/INTERVENTIONS:  Therapist utilized a person-centered approach to build rapport with patient.  Therapist implemented motivational interviewing techniques to assist patient with exploring personal growth and change.   Therapist applied cognitive behavioral strategies to facilitate identification of maladaptive patterns of thinking and behavior.  Therapist employed group interaction activities to build rapport among group members, promote healthy lifestyle, and emphasize improvement of symptoms.  Therapist promoted safe nonjudgmental environment by providing group members with unconditional positive regard and encouraging group members to comply with group rules and guidelines.  Therapist utilized dialectical behavior  techniques to teach and model emotional regulation and relaxation.  Therapist assisted group member with identifying and implementing healthier coping strategies.  Group member was encouraged to make positive daily choices, and maintain healthy boundaries. Group format provides experiential learning of the benefit of sharing openly with others.     PLAN:  Continue [facility].  Aftercare:  Step down to outpatient level of care     This document signed by Chantelle Lux [facility], June 9, 2023, 14:34 EDT

## 2023-06-09 NOTE — PROGRESS NOTES
NAME: Lloyd Espana  DATE: 06/05/2023    -- [facility] --      Time:   [id number]        DATA:          Psychotherapy Group (Check-in):  Therapist asked that each patient share a summary of how they're doing, including progress towards therapy goals and any new stressors that may have occurred, as well as any items they wish to put on today's agenda. Therapist provided empathy and support.      Education/Training Group: Group members received education about cognitive distortions. Therapist encouraged group members to share their thoughts and discuss this topic with one another. Therapist continued facilitation of group cohesiveness.      Psychotherapy Group: This  group focused on further exploring topics mentioned at check ins. Patients are given opportunities to build interpersonal confidence, practice communication skills, and receive empathic understanding from others. Therapist encourages group members to share and respond to one another about their experience with this.      Patient Response:     Personal Assessment 0-10 Scale (10 worst)   Depression: 9  Anxiety: 9    Patient arrived in person and participated in therapy today. Patient shared that he is very worried about his financial situation. States that he hasn't heard back from his employer about his taking a leave of absence. States that his anxiety is overwhelming today because he is worried that if he stops getting paid he won't be able to continue coming to [facility]. Therapist and group assisted patient in examining the helpfulness of this thought and processing his feelings.     ASSESSMENT:    Impression/Formulation:    ICD-10-CM ICD-9-CM   1. Post traumatic stress disorder (PTSD)  F43.10 309.81   2. Generalized anxiety disorder  F41.1 300.02   3. Bipolar II disorder  F31.81 296.89       CLINICAL MANUVERING/INTERVENTIONS:  Therapist utilized a person-centered approach to build rapport with patient.  Therapist implemented motivational interviewing  techniques to assist patient with exploring personal growth and change.   Therapist applied cognitive behavioral strategies to facilitate identification of maladaptive patterns of thinking and behavior.  Therapist employed group interaction activities to build rapport among group members, promote healthy lifestyle, and emphasize improvement of symptoms.  Therapist promoted safe nonjudgmental environment by providing group members with unconditional positive regard and encouraging group members to comply with group rules and guidelines.  Therapist utilized dialectical behavior techniques to teach and model emotional regulation and relaxation.  Therapist assisted group member with identifying and implementing healthier coping strategies.  Group member was encouraged to make positive daily choices, and maintain healthy boundaries. Group format provides experiential learning of the benefit of sharing openly with others.     PLAN:  Continue [facility].  Aftercare:  Step down to outpatient level of care     This document signed by Chantelle Lux [facility], June 9, 2023, 13:48 EDT

## 2023-06-12 ENCOUNTER — OFFICE VISIT (OUTPATIENT)
Dept: PSYCHIATRY | Facility: HOSPITAL | Age: 28
End: 2023-06-12
Payer: COMMERCIAL

## 2023-06-12 DIAGNOSIS — F43.10 POST TRAUMATIC STRESS DISORDER (PTSD): ICD-10-CM

## 2023-06-12 DIAGNOSIS — F33.2 SEVERE EPISODE OF RECURRENT MAJOR DEPRESSIVE DISORDER, WITHOUT PSYCHOTIC FEATURES: Primary | ICD-10-CM

## 2023-06-12 PROCEDURE — S9480 INTENSIVE OUTPATIENT PSYCHIA: HCPCS | Performed by: SOCIAL WORKER

## 2023-06-12 NOTE — PROGRESS NOTES
NAME: Lloyd Espana  DATE: 06/12/2023    [facility] Phase  NA  [id number]    Services Provided    Group Psychotherapy x 1  Education/Training x 2  Activity Therapy  x 0  Individual Therapy x 0 0 minutes  Family Therapy x 0  MD Initial Visit  x 0  MD Follow-Up   x 0    Number of participants: 9    DATA:  Patient reported this weekend had been stressful having his stepson with him. He had told the patient he was scared of him because he nagged too much. His stepson was 10 years old. He had only been trying to help him clean up after himself. The weekend, overall, was not bad. He and his  had taken his stepson to a friend's pool. They had also tried to help a friend with grocery shopping, but he had not wanted to buy anything because he didn't want to spend more money. This interaction had put him in a bad mood. He and his  had felt unappreciated.     Regarding his stepson, he had found himself having the primary responsibility of care and discipline while his  was away at work during the nighttime. Peers offered advice to which the patient was receptive.     Individual: No    Homework: None assigned    Group 1 (Psychotherapy: Check-ins): Therapist continued facilitation of rapport building strategies between group members. Therapist asked that each patient check in with home life and recovery efforts and identify triggers, cravings, and high risk situations that arise between group sessions.  Group members discussed barriers and benefits of attending recovery meetings.  Therapist provided empathy and support during group session. Daily Reading: None    Group 2  (CBT) Reviewed the cognitive model and went through an illustration in order to apply the model.     Group 3 (Psychoeducation) This hour was facilitated [facility].     ASSESSMENT:    Personal Assessment 0-10 Scale (10 worst)    Anxiety:  8 (no comment)  Depression:  5 (no comment)     MSE within normal limits? No Affect: dysthymic  Mood: depressed  Pt Response:  open/receptive  Engaged in activity/Process and self-disclosed: adequate  Applies today's group topics to self: adequate  Able to give and receive feedback: adequate  Demonstrated insightful thinking: consistent and adequate  Other pt response comments:  Patient was a positive participant. They demonstrated a relatively optimistic attitude, a moderate degree of motivation, and adequate insight, as evidenced by his level of engagement combined with his level of open-mindedness to suggestions from his peers. They seemed interested and attentive. They appeared to comprehend well the concepts being discussed. The patient seemed receptive of, and willing to implement, the ideas being discussed. Their thought process appeared linear and goal directed, and their speech was regular rate and rhythm.       .  No visits with results within 3 Week(s) from this visit.   Latest known visit with results is:   Lab on 04/17/2023   Component Date Value Ref Range Status    Glucose 04/17/2023 81  65 - 99 mg/dL Final    BUN 04/17/2023 17  6 - 20 mg/dL Final    Creatinine 04/17/2023 1.04  0.76 - 1.27 mg/dL Final    Sodium 04/17/2023 141  136 - 145 mmol/L Final    Potassium 04/17/2023 4.3  3.5 - 5.2 mmol/L Final    Chloride 04/17/2023 106  98 - 107 mmol/L Final    CO2 04/17/2023 25.0  22.0 - 29.0 mmol/L Final    Calcium 04/17/2023 9.1  8.6 - 10.5 mg/dL Final    Total Protein 04/17/2023 7.1  6.0 - 8.5 g/dL Final    Albumin 04/17/2023 4.6  3.5 - 5.2 g/dL Final    ALT (SGPT) 04/17/2023 51 (H)  1 - 41 U/L Final    AST (SGOT) 04/17/2023 27  1 - 40 U/L Final    Alkaline Phosphatase 04/17/2023 67  39 - 117 U/L Final    Total Bilirubin 04/17/2023 0.5  0.0 - 1.2 mg/dL Final    Globulin 04/17/2023 2.5  gm/dL Final    A/G Ratio 04/17/2023 1.8  g/dL Final    BUN/Creatinine Ratio 04/17/2023 16.3  7.0 - 25.0 Final    Anion Gap 04/17/2023 10.0  5.0 - 15.0 mmol/L Final    eGFR 04/17/2023 101.6  >60.0 mL/min/1.73 Final     Total Cholesterol 04/17/2023 253 (H)  0 - 200 mg/dL Final    Triglycerides 04/17/2023 125  0 - 150 mg/dL Final    HDL Cholesterol 04/17/2023 73 (H)  40 - 60 mg/dL Final    LDL Cholesterol  04/17/2023 158 (H)  0 - 100 mg/dL Final    VLDL Cholesterol 04/17/2023 22  5 - 40 mg/dL Final    LDL/HDL Ratio 04/17/2023 2.12   Final    TSH 04/17/2023 1.470  0.270 - 4.200 uIU/mL Final    Free T4 04/17/2023 1.19  0.93 - 1.70 ng/dL Final    Vitamin B-12 04/17/2023 439  211 - 946 pg/mL Final    25 Hydroxy, Vitamin D 04/17/2023 69.4  30.0 - 100.0 ng/ml Final    WBC 04/17/2023 6.11  3.40 - 10.80 10*3/mm3 Final    RBC 04/17/2023 5.79  4.14 - 5.80 10*6/mm3 Final    Hemoglobin 04/17/2023 14.8  13.0 - 17.7 g/dL Final    Hematocrit 04/17/2023 44.8  37.5 - 51.0 % Final    MCV 04/17/2023 77.4 (L)  79.0 - 97.0 fL Final    MCH 04/17/2023 25.6 (L)  26.6 - 33.0 pg Final    MCHC 04/17/2023 33.0  31.5 - 35.7 g/dL Final    RDW 04/17/2023 13.2  12.3 - 15.4 % Final    RDW-SD 04/17/2023 36.8 (L)  37.0 - 54.0 fl Final    MPV 04/17/2023 10.3  6.0 - 12.0 fL Final    Platelets 04/17/2023 168  140 - 450 10*3/mm3 Final    Neutrophil % 04/17/2023 52.9  42.7 - 76.0 % Final    Lymphocyte % 04/17/2023 39.1  19.6 - 45.3 % Final    Monocyte % 04/17/2023 7.2  5.0 - 12.0 % Final    Eosinophil % 04/17/2023 0.3  0.3 - 6.2 % Final    Basophil % 04/17/2023 0.2  0.0 - 1.5 % Final    Immature Grans % 04/17/2023 0.3  0.0 - 0.5 % Final    Neutrophils, Absolute 04/17/2023 3.23  1.70 - 7.00 10*3/mm3 Final    Lymphocytes, Absolute 04/17/2023 2.39  0.70 - 3.10 10*3/mm3 Final    Monocytes, Absolute 04/17/2023 0.44  0.10 - 0.90 10*3/mm3 Final    Eosinophils, Absolute 04/17/2023 0.02  0.00 - 0.40 10*3/mm3 Final    Basophils, Absolute 04/17/2023 0.01  0.00 - 0.20 10*3/mm3 Final    Immature Grans, Absolute 04/17/2023 0.02  0.00 - 0.05 10*3/mm3 Final    nRBC 04/17/2023 0.0  0.0 - 0.2 /100 WBC Final       Impression/Formulation:    ICD-10-CM ICD-9-CM   1. Severe episode of  recurrent major depressive disorder, without psychotic features  F33.2 296.33   2. Post traumatic stress disorder (PTSD)  F43.10 309.81        CLINICAL MANEUVERING/INTERVENTIONS: Therapist utilized a person-centered approach to build and maintain rapport with group member.   Therapist promoted safe nonjudgmental environment by providing group members with unconditional positive regard.  Assisted member in processing above session content; acknowledged and normalized patient's thoughts, feelings and concerns.  Allowed patient to freely discuss issues without interruption or judgment. Provided safe, confidential environment to facilitate the development of positive therapeutic relationship and encourage open, honest communication. Therapist assisted group member with identifying and implementing healthier coping strategies and maintaining healthier boundaries. Assisted patient in identifying risk factors which would indicate the need for higher level of care including thoughts to harm self or others and/or self-harming behavior and encouraged patient to contact this office, call 911, or present to the nearest emergency room should any of these events occur. Pt agreeable to adhere to medication regimen as prescribed and report any side effects.  Discussed crisis intervention services and means to access.  Patient adamantly and convincingly denies current suicidal or homicidal ideation or perceptual disturbance.      Therapist implemented motivational interviewing techniques to assist client with exploring and resolving ambivalence associated with commitment to change behaviors.  Yes  Therapist utilized dialectical behavior techniques to teach and model emotional regulation and relaxation.  No   Therapist applied cognitive behavioral strategies to facilitate identification of maladaptive patterns of thinking and behavior.  Yes     PLAN:    Continue [facility]  IOP Phase  NA  Aftercare:  [facility] outpatient therapy      Please note that portions of this note were completed with a voice recognition program. Efforts were made to edit dictation, but occasionally words are mistranscribed.     This document signed by Jack Sotomayor LCSW, June 12, 2023, 15:50 EDT

## 2023-06-13 ENCOUNTER — OFFICE VISIT (OUTPATIENT)
Dept: PSYCHIATRY | Facility: HOSPITAL | Age: 28
End: 2023-06-13
Payer: COMMERCIAL

## 2023-06-13 DIAGNOSIS — F41.1 GENERALIZED ANXIETY DISORDER: ICD-10-CM

## 2023-06-13 DIAGNOSIS — F43.10 POST TRAUMATIC STRESS DISORDER (PTSD): ICD-10-CM

## 2023-06-13 DIAGNOSIS — F33.2 SEVERE EPISODE OF RECURRENT MAJOR DEPRESSIVE DISORDER, WITHOUT PSYCHOTIC FEATURES: Primary | ICD-10-CM

## 2023-06-13 PROCEDURE — S9480 INTENSIVE OUTPATIENT PSYCHIA: HCPCS

## 2023-06-13 NOTE — PROGRESS NOTES
Adult  IOP Group      Date: June 12, 2023    Time: 3:00 pm -4:00 pm    Type of Group:  Psychoeducation    Group  Content: Stress management    Patient Response: Group members received education on stress management.  Participants were asked to describe their largest source of stress, in detail and briefly list two other stressors they are experiencing.  Participants then listed symptoms they have experienced in response to the source of stress.  Group members then discussed four categories to help manage stress (social support, emotional management, life balance, and basic needs).          Morteza Coleman  06/13/23  10:24 EDT

## 2023-06-13 NOTE — PROGRESS NOTES
NAME: Lloyd Espana  DATE: 06/07/2023    -- [facility] --     Time:   3164-9111      DATA:          Psychotherapy Group (Check-in):  Therapist asked that each patient share a summary of how they're doing, including progress towards therapy goals and any new stressors that may have occurred, as well as any items they wish to put on today's agenda. Therapist provided empathy and support.      Activity Therapy Group: Group members received recreational therapy from RT [facility].      Psychotherapy Group: This  group focused on further exploring topics mentioned at check ins. Patients are given opportunities to build interpersonal confidence, practice communication skills, and receive empathic understanding from others. Therapist encourages group members to share and respond to one another about their experience with this.     Patient Response:     Personal Assessment 0-10 Scale (10 worst)   Depression: 9  Anxiety: 5    Patient arrived in person and participated in therapy today. Patient shared that he isn’t feeling well today. Reports feeling depressed because he believes he’ll be stuck at his current job forever because he hasn’t been able to secure another job yet. Patient later talks about his tendency to accidently treat his step-son differently based on patient’s level of frustration. Reports worrying about this and states “I just don’t know how to not get frustrated.”     ASSESSMENT:    Impression/Formulation:    ICD-10-CM ICD-9-CM   1. Severe episode of recurrent major depressive disorder, without psychotic features  F33.2 296.33   2. Post traumatic stress disorder (PTSD)  F43.10 309.81   3. Generalized anxiety disorder  F41.1 300.02       CLINICAL MANUVERING/INTERVENTIONS:  Therapist utilized a person-centered approach to build rapport with patient.  Therapist implemented motivational interviewing techniques to assist patient with exploring personal growth and change.   Therapist applied cognitive  behavioral strategies to facilitate identification of maladaptive patterns of thinking and behavior.  Therapist employed group interaction activities to build rapport among group members, promote healthy lifestyle, and emphasize improvement of symptoms.  Therapist promoted safe nonjudgmental environment by providing group members with unconditional positive regard and encouraging group members to comply with group rules and guidelines.  Therapist utilized dialectical behavior techniques to teach and model emotional regulation and relaxation.  Therapist assisted group member with identifying and implementing healthier coping strategies.  Group member was encouraged to make positive daily choices, and maintain healthy boundaries. Group format provides experiential learning of the benefit of sharing openly with others.     PLAN:  Continue [facility].  Aftercare:  Step down to outpatient level of care     This document signed by YUVAL Bennett, June 13, 2023, 09:21 EDT

## 2023-06-14 ENCOUNTER — OFFICE VISIT (OUTPATIENT)
Dept: PSYCHIATRY | Facility: HOSPITAL | Age: 28
End: 2023-06-14
Payer: COMMERCIAL

## 2023-06-14 DIAGNOSIS — F33.2 SEVERE EPISODE OF RECURRENT MAJOR DEPRESSIVE DISORDER, WITHOUT PSYCHOTIC FEATURES: Primary | ICD-10-CM

## 2023-06-14 DIAGNOSIS — F41.1 GENERALIZED ANXIETY DISORDER: ICD-10-CM

## 2023-06-14 DIAGNOSIS — F43.10 POST TRAUMATIC STRESS DISORDER (PTSD): ICD-10-CM

## 2023-06-14 PROCEDURE — S9480 INTENSIVE OUTPATIENT PSYCHIA: HCPCS

## 2023-06-14 NOTE — PROGRESS NOTES
"This provider is located at [facility] located at [address], [place] [address].  The Patient is seen remotely at his/her home, using Zoom. Patient is being seen via telehealth and confirm that they are in a secure environment for this session. The patient's condition being diagnosed/treated is appropriate for telemedicine. The provider identified herself as well as her credentials.   The patient gave consent to be seen remotely, and when consent is given they understand that the consent allows for patient identifiable information to be sent to a third party as needed.   They may refuse to be seen remotely at any time. The electronic data is encrypted and password protected, and the patient has been advised of the potential risks to privacy not withstanding such measures.      NAME: Lloyd Espana  DATE: 06/13/2023    -- [facility] --     Time:   7751-6603      DATA:          Psychotherapy Group (Check-in):  Therapist asked that each patient share a summary of how they're doing, including progress towards therapy goals and any new stressors that may have occurred, as well as any items they wish to put on today's agenda. Therapist provided empathy and support.     Education/Training Group: Group members received education about healthy communication skills regarding sharing opinions in a helpful way. Therapist encouraged group members to share their thoughts and discuss this topic with one another. Therapist continued facilitation of group cohesiveness.      Psychotherapy Group: This group focused on trying out healthy communication skills in \"the here and now\" involving expressing opinions about one another's situations. Therapist encourages group members to share and respond to one another about their experience with this.     Patient Response:     Personal Assessment 0-10 Scale (10 worst)   Depression: 9  Anxiety: 5    Patient arrived via zoom and participated in therapy today. Patient " shared that he is feeling very stressed today. Patient engages in group conversations. Shares that he would like to talk about [employer], a place he has been trying to get a job at.     ASSESSMENT:    Impression/Formulation:    ICD-10-CM ICD-9-CM   1. Severe episode of recurrent major depressive disorder, without psychotic features  F33.2 296.33   2. Post traumatic stress disorder (PTSD)  F43.10 309.81   3. Generalized anxiety disorder  F41.1 300.02       CLINICAL MANUVERING/INTERVENTIONS:  Therapist utilized a person-centered approach to build rapport with patient.  Therapist implemented motivational interviewing techniques to assist patient with exploring personal growth and change.   Therapist applied cognitive behavioral strategies to facilitate identification of maladaptive patterns of thinking and behavior.  Therapist employed group interaction activities to build rapport among group members, promote healthy lifestyle, and emphasize improvement of symptoms.  Therapist promoted safe nonjudgmental environment by providing group members with unconditional positive regard and encouraging group members to comply with group rules and guidelines.  Therapist utilized dialectical behavior techniques to teach and model emotional regulation and relaxation.  Therapist assisted group member with identifying and implementing healthier coping strategies.  Group member was encouraged to make positive daily choices, and maintain healthy boundaries. Group format provides experiential learning of the benefit of sharing openly with others.     PLAN:  Continue [facility].  Aftercare:  Step down to outpatient level of care     This document signed by Chantelle Lux [facility], June 14, 2023, 10:34 EDT

## 2023-06-15 ENCOUNTER — OFFICE VISIT (OUTPATIENT)
Dept: PSYCHIATRY | Facility: HOSPITAL | Age: 28
End: 2023-06-15
Payer: COMMERCIAL

## 2023-06-15 ENCOUNTER — OFFICE VISIT (OUTPATIENT)
Dept: PSYCHIATRY | Facility: CLINIC | Age: 28
End: 2023-06-15
Payer: COMMERCIAL

## 2023-06-15 DIAGNOSIS — F41.1 GENERALIZED ANXIETY DISORDER: ICD-10-CM

## 2023-06-15 DIAGNOSIS — F41.1 GENERALIZED ANXIETY DISORDER: Primary | ICD-10-CM

## 2023-06-15 DIAGNOSIS — F33.2 SEVERE EPISODE OF RECURRENT MAJOR DEPRESSIVE DISORDER, WITHOUT PSYCHOTIC FEATURES: ICD-10-CM

## 2023-06-15 DIAGNOSIS — F51.5 NIGHTMARES: ICD-10-CM

## 2023-06-15 PROCEDURE — S9480 INTENSIVE OUTPATIENT PSYCHIA: HCPCS

## 2023-06-15 RX ORDER — CLONIDINE HYDROCHLORIDE 0.2 MG/1
0.2 TABLET ORAL NIGHTLY
Qty: 30 TABLET | Refills: 1 | Status: SHIPPED | OUTPATIENT
Start: 2023-06-15

## 2023-06-15 RX ORDER — PRAZOSIN HYDROCHLORIDE 5 MG/1
5 CAPSULE ORAL NIGHTLY
Qty: 30 CAPSULE | Refills: 1 | Status: SHIPPED | OUTPATIENT
Start: 2023-06-15

## 2023-06-15 RX ORDER — BUPROPION HYDROCHLORIDE 300 MG/1
300 TABLET ORAL EVERY MORNING
Qty: 30 TABLET | Refills: 0 | Status: SHIPPED | OUTPATIENT
Start: 2023-06-15

## 2023-06-15 RX ORDER — HYDROXYZINE HYDROCHLORIDE 25 MG/1
25 TABLET, FILM COATED ORAL 3 TIMES DAILY PRN
Qty: 90 TABLET | Refills: 1 | Status: SHIPPED | OUTPATIENT
Start: 2023-06-15

## 2023-06-15 RX ORDER — BUSPIRONE HYDROCHLORIDE 30 MG/1
30 TABLET ORAL 2 TIMES DAILY
Qty: 60 TABLET | Refills: 1 | Status: SHIPPED | OUTPATIENT
Start: 2023-06-15

## 2023-06-15 RX ORDER — TRAZODONE HYDROCHLORIDE 50 MG/1
50 TABLET ORAL NIGHTLY
Qty: 30 TABLET | Refills: 0 | Status: SHIPPED | OUTPATIENT
Start: 2023-06-15

## 2023-06-15 RX ORDER — BREXPIPRAZOLE 4 MG/1
4 TABLET ORAL DAILY
Qty: 30 TABLET | Refills: 1 | Status: SHIPPED | OUTPATIENT
Start: 2023-06-15
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
## 2023-06-16 NOTE — PROGRESS NOTES
NAME: Lloyd Espana  DATE: 06/14/2023    --  IOP --     Time:   [id number]      DATA:          Psychotherapy Group (Check-in):  Therapist asked that each patient share a summary of how they're doing, including progress towards therapy goals and any new stressors that may have occurred, as well as any items they wish to put on today's agenda. Therapist provided empathy and support.     Psychotherapy Group: This  group focused on further exploring topics mentioned at check ins. Patients are given opportunities to build interpersonal confidence, practice communication skills, and receive empathic understanding from others. Therapist encourages group members to share and respond to one another about their experience with this.     Activity Therapy Group: Group members received activity therapy from RT Rachel.      Patient Response:     Personal Assessment 0-10 Scale (10 worst)   Depression: 6  Anxiety: 8    Patient arrived in person and participated in therapy today. Patient shared that he is overwhelmed by anxious thoughts about his finances and his work situation, worrying that now that he has a job he will have to stop IOP prematurely. Patient processes these thoughts with the group.     ASSESSMENT:    Impression/Formulation:    ICD-10-CM ICD-9-CM   1. Severe episode of recurrent major depressive disorder, without psychotic features  F33.2 296.33   2. Post traumatic stress disorder (PTSD)  F43.10 309.81   3. Generalized anxiety disorder  F41.1 300.02       CLINICAL MANUVERING/INTERVENTIONS:  Therapist utilized a person-centered approach to build rapport with patient.  Therapist implemented motivational interviewing techniques to assist patient with exploring personal growth and change.   Therapist applied cognitive behavioral strategies to facilitate identification of maladaptive patterns of thinking and behavior.  Therapist employed group interaction activities to build rapport among group members,  promote healthy lifestyle, and emphasize improvement of symptoms.  Therapist promoted safe nonjudgmental environment by providing group members with unconditional positive regard and encouraging group members to comply with group rules and guidelines.  Therapist utilized dialectical behavior techniques to teach and model emotional regulation and relaxation.  Therapist assisted group member with identifying and implementing healthier coping strategies.  Group member was encouraged to make positive daily choices, and maintain healthy boundaries. Group format provides experiential learning of the benefit of sharing openly with others.     PLAN:  Continue [facility].  Aftercare:  Step down to outpatient level of care     This document signed by Chantelle Lux [facility], June 16, 2023, 10:13 EDT

## 2023-06-19 NOTE — PROGRESS NOTES
NAME: Lloyd Espana  DATE: 06/15/2023    -- [facility] --     Time:   [id number]      DATA:          Psychotherapy Group (Check-in):  Therapist asked that each patient share a summary of how they're doing, including progress towards therapy goals and any new stressors that may have occurred, as well as any items they wish to put on today's agenda. Therapist provided empathy and support.     Education/Training Group: Group members received education about DBT and radical acceptance. Therapist encouraged group members to share their thoughts and discuss this topic with one another. Therapist continued facilitation of group cohesiveness.      Psychotherapy Group: This group focused on graduation from the program. Graduating group member gives presentation about what they gained from group, what their plans are for the future, and offers advice about how to get the most out of group. Therapist encourages group members to share and respond to one another about their experience with this, as well as offer a word of encouragement to the graduating member. Therapist guides conversation to instil sense of hope and universality and to teach communication skills.    Patient Response:     Personal Assessment 0-10 Scale (10 worst)   Depression: 10  Anxiety: 5    Patient arrived in person and participated in therapy today. Patient shared that he is feeling depressed today about an argument he had with his . Patient later provides thoughtful response to graduating group member.     ASSESSMENT:    Impression/Formulation:    ICD-10-CM ICD-9-CM   1. Generalized anxiety disorder  F41.1 300.02   2. Nightmares  F51.5 307.47       CLINICAL MANUVERING/INTERVENTIONS:  Therapist utilized a person-centered approach to build rapport with patient.  Therapist implemented motivational interviewing techniques to assist patient with exploring personal growth and change.   Therapist applied cognitive behavioral strategies to facilitate  identification of maladaptive patterns of thinking and behavior.  Therapist employed group interaction activities to build rapport among group members, promote healthy lifestyle, and emphasize improvement of symptoms.  Therapist promoted safe nonjudgmental environment by providing group members with unconditional positive regard and encouraging group members to comply with group rules and guidelines.  Therapist utilized dialectical behavior techniques to teach and model emotional regulation and relaxation.  Therapist assisted group member with identifying and implementing healthier coping strategies.  Group member was encouraged to make positive daily choices, and maintain healthy boundaries. Group format provides experiential learning of the benefit of sharing openly with others.     PLAN:  Continue [facility].  Aftercare:  Step down to outpatient level of care     This document signed by Chantelle Lux [facility], June 19, 2023, 11:59 EDT

## 2023-06-19 NOTE — PROGRESS NOTES
"     NAME: Lloyd Espana  DATE: 06/15/2023    Patient is currently in [x] IOP [] PHP [] Outpatient     Time:   [id number]      DATA:          Individual Psychotherapy Session: Therapist encouraged patient to check-in regarding symptoms and how things are going. Therapist encouraged patient to share thoughts and feelings about being in group. Patient and therapist collaborate to update and develop individualized treatment goals. Therapist spoke with patient regarding family support and encouraged family/support system involvement in patient's treatment.       Patient Response:     Patient arrived in person and participated in therapy today. Patient shared that he sometimes feels stuck in his relationship. Reports that he is feeling overwhelmed with anxiety about his financial situation. Patient reports that he lets his spouse use patient's credit card but it started out under that agreement that all transactions would be agreed upon, states that his spouse uses the card without consulting patient first. Patient states that he puts off making decisions because he wants his  to make the decisions. Reports having \"no christopher\" in his own ability to make decisions. Therapist assists patient in recognizing his pattern of relinquishing control to his spouse and then becoming anxious and/or blaming his spouse for the outcome of the decisions.     ASSESSMENT:  MENTAL STATUS EXAM   General Appearance:  Cleanly groomed and dressed  Eye Contact:  Good eye contact  Motor Activity:  Normal gait, posture  Speech:  Normal rate, tone, volume  Mood and affect:  Normal, pleasant  Thought Process:  Logical  Suicidal Ideations:  Not present  Homicidal Ideation:  Not present  Insight:  Fair  Judgement:  Fair  Reliability:  Fair  Impulse Control:  Fair    Progress toward goal: Not at goal    PLAN:  Patient will continue in Intensive Outpatient therapy to work towards goals including decreased anxiety and depressed mood, increased " ability to cope with symptoms, and establishment of a support network. Patient will return to therapy 4 days per week.    Impression/Formulation:    ICD-10-CM ICD-9-CM   1. Generalized anxiety disorder  F41.1 300.02   2. Severe episode of recurrent major depressive disorder, without psychotic features  F33.2 296.33       CLINICAL MANUVERING/INTERVENTIONS:  Therapist utilized a person-centered approach to build rapport with patient.  Therapist implemented motivational interviewing techniques to assist patient with exploring personal growth and change.   Therapist applied cognitive behavioral strategies to facilitate identification of maladaptive patterns of thinking and behavior. Therapist promoted safe nonjudgmental environment by providing patient with unconditional positive regard.  Therapist utilized dialectical behavior techniques to teach and model emotional regulation and relaxation.  Therapist assisted patient with identifying and implementing healthier coping strategies.  Patient was encouraged to make positive daily choices, and maintain healthy boundaries.        This document signed by Chantelle Lux [facility], June 19, 2023, 12:33 EDT

## 2023-07-26 ENCOUNTER — TELEMEDICINE (OUTPATIENT)
Dept: PSYCHIATRY | Facility: CLINIC | Age: 28
End: 2023-07-26
Payer: COMMERCIAL

## 2023-07-26 DIAGNOSIS — F90.0 ADHD (ATTENTION DEFICIT HYPERACTIVITY DISORDER), INATTENTIVE TYPE: Primary | ICD-10-CM

## 2023-07-26 DIAGNOSIS — F51.5 NIGHTMARES: ICD-10-CM

## 2023-07-26 DIAGNOSIS — F41.1 GENERALIZED ANXIETY DISORDER: ICD-10-CM

## 2023-07-26 DIAGNOSIS — F31.81 BIPOLAR II DISORDER: ICD-10-CM

## 2023-07-26 RX ORDER — CLONIDINE HYDROCHLORIDE 0.2 MG/1
0.2 TABLET ORAL NIGHTLY
Qty: 30 TABLET | Refills: 1 | Status: SHIPPED | OUTPATIENT
Start: 2023-07-26

## 2023-07-26 RX ORDER — PRAZOSIN HYDROCHLORIDE 5 MG/1
5 CAPSULE ORAL NIGHTLY
Qty: 30 CAPSULE | Refills: 1 | Status: SHIPPED | OUTPATIENT
Start: 2023-07-26

## 2023-07-26 RX ORDER — BUPROPION HYDROCHLORIDE 450 MG/1
450 TABLET, FILM COATED, EXTENDED RELEASE ORAL EVERY MORNING
Qty: 30 TABLET | Refills: 0 | Status: SHIPPED | OUTPATIENT
Start: 2023-07-26

## 2023-07-26 RX ORDER — BUSPIRONE HYDROCHLORIDE 30 MG/1
30 TABLET ORAL 2 TIMES DAILY
Qty: 60 TABLET | Refills: 1 | Status: SHIPPED | OUTPATIENT
Start: 2023-07-26

## 2023-07-26 RX ORDER — TRAZODONE HYDROCHLORIDE 100 MG/1
100 TABLET ORAL NIGHTLY
Qty: 30 TABLET | Refills: 0 | Status: SHIPPED | OUTPATIENT
Start: 2023-07-26

## 2023-07-26 RX ORDER — BREXPIPRAZOLE 4 MG/1
4 TABLET ORAL DAILY
Qty: 30 TABLET | Refills: 1 | Status: SHIPPED | OUTPATIENT
Start: 2023-07-26

## 2023-07-26 NOTE — PROGRESS NOTES
This provider is located at the [facility] (through [facility]), [address] [place] [address], [id number] using a secure [employer] Video Visit through [employer]. Patient is being seen remotely via telehealth at their home address in [place], and stated they are in a secure environment for this session. The patient's condition being diagnosed/treated is appropriate for telemedicine. The provider identified herself as well as her credentials.  The patient, and/or patients guardian, consent to be seen remotely, and when consent is given they understand that the consent allows for patient identifiable information to be sent to a third party as needed.   They may refuse to be seen remotely at any time. The electronic data is encrypted and password protected, and the patient and/or guardian has been advised of the potential risks to privacy not withstanding such measures.    You have chosen to receive care through a telehealth visit.  Do you consent to use a video/audio connection for your medical care today? Yes    Patient identifiers utilized: Name and date of birth.    Patient verbally confirmed consent for today's encounter:  July 26, 2023    Radha Espana is a 28 y.o. male who presents today for initial evaluation     Chief Complaint: Anxiety, ADHD, bipolar 2       History of Present Illness:    History of Present Illness  Brandan is a 28-year-old male who I saw today for an initial evaluation via telehealth.  He has previously seen Dr. Beck here  while in the intensive outpatient program.  She tells me that he had dropped the program because it was too overwhelming driving from [place] to [place] 4 days a week.  He tells me that he feels like his depression has been well managed with Wellbutrin 300 mg p.o. daily but tells me that he has lately been having a lot of erectile dysfunction and difficulty with concentration, focus, and memory.  Feels like his issues with erectile dysfunction started around  the time his Lamictal was increased.  He tells me that he has no interest in sexual activity and is also not able to maintain an erection even if he is interested.  Patient tells me that there is no major relationship strain between himself and his .  He tells me that he does stay stressed about his finances and has started a new work from home job.  He tells me the Wellbutrin has helped some at first that his concentration and focus and feels like it has significantly helped with depression but reports having difficulty remembering things, frequently losing things, procrastinating, talking over others, and feeling like he is careless but does not mean to be.  He denies any issues with impulsivity.  He states that there are times when he does feel more irritable, mostly in the mornings.  He denies any history of suicide attempts or inpatient psychiatric hospitalizations.  He denies having a lot of mood swings.  He tells me that his sleep has not been the best.  He reports having nightmares frequently and will often toss and turn at night.  He states that he takes the trazodone every night about 930 and it is usually midnight before he can fall asleep.  He tells me that his anxiety has been okay lately and he has not had to take the as needed hydroxyzine.  He states that he was taking Klonopin while living in [place] and has also previously taken Trileptal for mood.     The following portions of the patient's history were reviewed and updated as appropriate: allergies, current medications, past family history, past medical history, past social history, past surgical history and problem list.    Past Psychiatric History:  Began Treatment:This year  Diagnoses:Anxiety and bipolor disorder, ADHD  Psychiatrist: Saw Dr. Beck  During IOP  Therapist: Radha malave at the [facility]  Admission History:Denies  Medication Trials: Trileptal, hydroxyzine, Klonopin, prazosin, Wellbutrin, BuSpar, Lamictal, Rexulti, and  trazodone  Self Harm: Denies  Suicide Attempts:Denies      Past Medical History:  Past Medical History:   Diagnosis Date    ADHD (attention deficit hyperactivity disorder)     Depression     PTSD (post-traumatic stress disorder)        Substance Abuse History:   Types:Denies all, including illicit  Withdrawal Symptoms:Denies  Longest Period Sober:Not Applicable   AA: Not applicable     Social History:  Social History     Socioeconomic History    Marital status:    Tobacco Use    Smoking status: Never    Smokeless tobacco: Never   Substance and Sexual Activity    Alcohol use: Never    Drug use: Never    Sexual activity: Yes     Partners: Male     Birth control/protection: None       Family History:  Family History   Problem Relation Age of Onset    Anxiety disorder Mother     Depression Mother     Depression Father        Past Surgical History:  History reviewed. No pertinent surgical history.    Problem List:  There is no problem list on file for this patient.      Allergy:   Allergies   Allergen Reactions    Meloxicam Headache     migraines    Doxycycline Rash        Current Medications:   Current Outpatient Medications   Medication Sig Dispense Refill    Brexpiprazole (Rexulti) 4 MG tablet Take 1 tablet by mouth Daily. 30 tablet 1    buPROPion XL (FORFIVO XL) 450 MG 24 hr tablet Take 1 tablet by mouth Every Morning. 30 tablet 0    busPIRone (BUSPAR) 30 MG tablet Take 1 tablet by mouth 2 (Two) Times a Day. 60 tablet 1    cloNIDine (CATAPRES) 0.2 MG tablet Take 1 tablet by mouth Every Night. 30 tablet 1    prazosin (MINIPRESS) 5 MG capsule Take 1 capsule by mouth Every Night. 30 capsule 1    traZODone (DESYREL) 100 MG tablet Take 1 tablet by mouth Every Night. 30 tablet 0    cholecalciferol (VITAMIN D3) 1.25 MG (62469 UT) capsule Take 1 capsule by mouth Every 7 (Seven) Days. 4 capsule 11    clonazePAM (KlonoPIN) 1 MG tablet Take 1 tablet by mouth Daily As Needed.      hydrOXYzine (ATARAX) 25 MG tablet Take 1  tablet by mouth 3 (Three) Times a Day As Needed for Anxiety. 90 tablet 1    ibuprofen (ADVIL,MOTRIN) 800 MG tablet Take 1 tablet by mouth Every 8 (Eight) Hours As Needed. for pain      omeprazole (priLOSEC) 40 MG capsule        No current facility-administered medications for this visit.       Review of Systems:    Review of Systems   Constitutional:  Positive for activity change and fatigue.   Neurological:  Negative for seizures.   Psychiatric/Behavioral:  Positive for decreased concentration, sleep disturbance and stress. Negative for agitation, behavioral problems, dysphoric mood, hallucinations, self-injury, suicidal ideas, negative for hyperactivity and depressed mood. The patient is nervous/anxious.        Physical Exam:   Physical Exam  Constitutional:       Appearance: Normal appearance.   Pulmonary:      Effort: Pulmonary effort is normal.   Musculoskeletal:         General: Normal range of motion.      Cervical back: Normal range of motion.   Neurological:      Mental Status: He is alert.   Psychiatric:         Attention and Perception: He is inattentive.         Mood and Affect: Affect normal. Mood is anxious.         Speech: Speech normal.         Behavior: Behavior normal. Behavior is cooperative.         Thought Content: Thought content normal.         Cognition and Memory: Cognition normal. He exhibits impaired recent memory.         Judgment: Judgment normal.       Vitals:  There were no vitals taken for this visit. There is no height or weight on file to calculate BMI.  Due to extenuating circumstances and possible current health risks associated with the patient being present in a clinical setting (with current health restrictions in place in regards to possible COVID 19 transmission/exposure), the patient was seen remotely today via a MyChart Video Visit through [employer].  Unable to obtain vital signs due to nature of remote visit.      Last 3 Blood Pressure Readings:  BP Readings from Last 3  Encounters:   04/17/23 128/80       Mental Status Exam:   Hygiene:   good  Cooperation:  Cooperative  Eye Contact:  Good  Psychomotor Behavior:  Appropriate  Affect:  Full range  Mood: anxious  Hopelessness: Denies  Speech:  Normal  Thought Process:  Linear  Thought Content:  Normal  Suicidal:  None  Homicidal:  None  Hallucinations:  None  Delusion:  None  Memory:  Intact  Orientation:  Person, Place, Time, and Situation  Reliability:  good  Insight:  Good  Judgement:  Good  Impulse Control:  Fair  Physical/Medical Issues:  Yes see past medical history        Lab Results:   No visits with results within 3 Month(s) from this visit.   Latest known visit with results is:   Lab on 04/17/2023   Component Date Value Ref Range Status    Glucose 04/17/2023 81  65 - 99 mg/dL Final    BUN 04/17/2023 17  6 - 20 mg/dL Final    Creatinine 04/17/2023 1.04  0.76 - 1.27 mg/dL Final    Sodium 04/17/2023 141  136 - 145 mmol/L Final    Potassium 04/17/2023 4.3  3.5 - 5.2 mmol/L Final    Chloride 04/17/2023 106  98 - 107 mmol/L Final    CO2 04/17/2023 25.0  22.0 - 29.0 mmol/L Final    Calcium 04/17/2023 9.1  8.6 - 10.5 mg/dL Final    Total Protein 04/17/2023 7.1  6.0 - 8.5 g/dL Final    Albumin 04/17/2023 4.6  3.5 - 5.2 g/dL Final    ALT (SGPT) 04/17/2023 51 (H)  1 - 41 U/L Final    AST (SGOT) 04/17/2023 27  1 - 40 U/L Final    Alkaline Phosphatase 04/17/2023 67  39 - 117 U/L Final    Total Bilirubin 04/17/2023 0.5  0.0 - 1.2 mg/dL Final    Globulin 04/17/2023 2.5  gm/dL Final    A/G Ratio 04/17/2023 1.8  g/dL Final    BUN/Creatinine Ratio 04/17/2023 16.3  7.0 - 25.0 Final    Anion Gap 04/17/2023 10.0  5.0 - 15.0 mmol/L Final    eGFR 04/17/2023 101.6  >60.0 mL/min/1.73 Final    Total Cholesterol 04/17/2023 253 (H)  0 - 200 mg/dL Final    Triglycerides 04/17/2023 125  0 - 150 mg/dL Final    HDL Cholesterol 04/17/2023 73 (H)  40 - 60 mg/dL Final    LDL Cholesterol  04/17/2023 158 (H)  0 - 100 mg/dL Final    VLDL Cholesterol 04/17/2023  22  5 - 40 mg/dL Final    LDL/HDL Ratio 04/17/2023 2.12   Final    TSH 04/17/2023 1.470  0.270 - 4.200 uIU/mL Final    Free T4 04/17/2023 1.19  0.93 - 1.70 ng/dL Final    Vitamin B-12 04/17/2023 439  211 - 946 pg/mL Final    25 Hydroxy, Vitamin D 04/17/2023 69.4  30.0 - 100.0 ng/ml Final    WBC 04/17/2023 6.11  3.40 - 10.80 10*3/mm3 Final    RBC 04/17/2023 5.79  4.14 - 5.80 10*6/mm3 Final    Hemoglobin 04/17/2023 14.8  13.0 - 17.7 g/dL Final    Hematocrit 04/17/2023 44.8  37.5 - 51.0 % Final    MCV 04/17/2023 77.4 (L)  79.0 - 97.0 fL Final    MCH 04/17/2023 25.6 (L)  26.6 - 33.0 pg Final    MCHC 04/17/2023 33.0  31.5 - 35.7 g/dL Final    RDW 04/17/2023 13.2  12.3 - 15.4 % Final    RDW-SD 04/17/2023 36.8 (L)  37.0 - 54.0 fl Final    MPV 04/17/2023 10.3  6.0 - 12.0 fL Final    Platelets 04/17/2023 168  140 - 450 10*3/mm3 Final    Neutrophil % 04/17/2023 52.9  42.7 - 76.0 % Final    Lymphocyte % 04/17/2023 39.1  19.6 - 45.3 % Final    Monocyte % 04/17/2023 7.2  5.0 - 12.0 % Final    Eosinophil % 04/17/2023 0.3  0.3 - 6.2 % Final    Basophil % 04/17/2023 0.2  0.0 - 1.5 % Final    Immature Grans % 04/17/2023 0.3  0.0 - 0.5 % Final    Neutrophils, Absolute 04/17/2023 3.23  1.70 - 7.00 10*3/mm3 Final    Lymphocytes, Absolute 04/17/2023 2.39  0.70 - 3.10 10*3/mm3 Final    Monocytes, Absolute 04/17/2023 0.44  0.10 - 0.90 10*3/mm3 Final    Eosinophils, Absolute 04/17/2023 0.02  0.00 - 0.40 10*3/mm3 Final    Basophils, Absolute 04/17/2023 0.01  0.00 - 0.20 10*3/mm3 Final    Immature Grans, Absolute 04/17/2023 0.02  0.00 - 0.05 10*3/mm3 Final    nRBC 04/17/2023 0.0  0.0 - 0.2 /100 WBC Final         Assessment & Plan   Problems Addressed this Visit    None  Visit Diagnoses       ADHD (attention deficit hyperactivity disorder), inattentive type    -  Primary    Relevant Medications    traZODone (DESYREL) 100 MG tablet    Brexpiprazole (Rexulti) 4 MG tablet    busPIRone (BUSPAR) 30 MG tablet    buPROPion XL (FORFIVO XL) 450 MG 24  hr tablet    Bipolar II disorder        Relevant Medications    traZODone (DESYREL) 100 MG tablet    Brexpiprazole (Rexulti) 4 MG tablet    busPIRone (BUSPAR) 30 MG tablet    buPROPion XL (FORFIVO XL) 450 MG 24 hr tablet    Nightmares        Relevant Medications    traZODone (DESYREL) 100 MG tablet    Brexpiprazole (Rexulti) 4 MG tablet    prazosin (MINIPRESS) 5 MG capsule    busPIRone (BUSPAR) 30 MG tablet    buPROPion XL (FORFIVO XL) 450 MG 24 hr tablet    Generalized anxiety disorder        Relevant Medications    traZODone (DESYREL) 100 MG tablet    Brexpiprazole (Rexulti) 4 MG tablet    cloNIDine (CATAPRES) 0.2 MG tablet    busPIRone (BUSPAR) 30 MG tablet    buPROPion XL (FORFIVO XL) 450 MG 24 hr tablet          Diagnoses         Codes Comments    ADHD (attention deficit hyperactivity disorder), inattentive type    -  Primary ICD-10-CM: F90.0  ICD-9-CM: 314.00     Bipolar II disorder     ICD-10-CM: F31.81  ICD-9-CM: 296.89     Nightmares     ICD-10-CM: F51.5  ICD-9-CM: 307.47     Generalized anxiety disorder     ICD-10-CM: F41.1  ICD-9-CM: 300.02             Visit Diagnoses:    ICD-10-CM ICD-9-CM   1. ADHD (attention deficit hyperactivity disorder), inattentive type  F90.0 314.00   2. Bipolar II disorder  F31.81 296.89   3. Nightmares  F51.5 307.47   4. Generalized anxiety disorder  F41.1 300.02         GOALS:  Short Term Goals: Patient will be compliant with medication, and patient will have no significant medication related side effects.  Patient will be engaged in psychotherapy as indicated.  Patient will report subjective improvement of symptoms.  Long term goals: To stabilize mood and treat/improve subjective symptoms, the patient will stay out of the hospital, the patient will be at an optimal level of functioning, and the patient will take all medications as prescribed.  The patient/guardian verbalized understanding and agreement with goals that were mutually set.      TREATMENT PLAN: Continue supportive  psychotherapy efforts and medications as indicated.  Pharmacological and Non-Pharmacological treatment options discussed during today's visit. Patient/Guardian acknowledged and verbally consented with current treatment plan and was educated on the importance of compliance with treatment and follow-up appointments.      MEDICATION ISSUES:  Discussed medication options and treatment plan of prescribed medication as well as the risks, benefits, any black box warnings, and side effects including potential falls, possible impaired driving, and metabolic adversities among others. Patient is agreeable to call the office with any worsening of symptoms or onset of side effects, or if any concerns or questions arise.  The contact information for the office is made available to the patient. Patient is agreeable to call 911 or go to the nearest ER should they begin having any SI/HI, or if any urgent concerns arise. No medication side effects or related complaints today.     MEDS ORDERED DURING VISIT:  New Medications Ordered This Visit   Medications    traZODone (DESYREL) 100 MG tablet     Sig: Take 1 tablet by mouth Every Night.     Dispense:  30 tablet     Refill:  0    Brexpiprazole (Rexulti) 4 MG tablet     Sig: Take 1 tablet by mouth Daily.     Dispense:  30 tablet     Refill:  1    prazosin (MINIPRESS) 5 MG capsule     Sig: Take 1 capsule by mouth Every Night.     Dispense:  30 capsule     Refill:  1    cloNIDine (CATAPRES) 0.2 MG tablet     Sig: Take 1 tablet by mouth Every Night.     Dispense:  30 tablet     Refill:  1    busPIRone (BUSPAR) 30 MG tablet     Sig: Take 1 tablet by mouth 2 (Two) Times a Day.     Dispense:  60 tablet     Refill:  1    buPROPion XL (FORFIVO XL) 450 MG 24 hr tablet     Sig: Take 1 tablet by mouth Every Morning.     Dispense:  30 tablet     Refill:  0     Plan:  - Discontinue lamotrigine as patient feels like this could possibly be causing the erectile dysfunction.  - Increase trazodone to 100  mg p.o. nightly.  - Increase Wellbutrin XL to 450 mg p.o. daily.  - Continue Rexulti 4 mg p.o. daily.  - Continue prazosin 5 mg p.o. nightly.  - Continue clonidine 0.2 mg p.o. nightly.  - Continue BuSpar 30 mg p.o. twice daily.  - Follow-up in 4 weeks via video visit.    Follow Up Appointment:   Return in about 4 weeks (around 8/23/2023) for Recheck, Video visit.             This document has been electronically signed by PACHECO Salter  July 26, 2023 15:57 EDT    Dictated Utilizing Dragon Dictation: Part of this note may be an electronic transcription/translation of spoken language to printed text using the Dragon Dictation System.

## 2023-08-03 ENCOUNTER — TELEPHONE (OUTPATIENT)
Dept: PSYCHIATRY | Facility: HOSPITAL | Age: 28
End: 2023-08-03

## 2023-08-03 RX ORDER — BUPROPION HYDROCHLORIDE 300 MG/1
300 TABLET ORAL EVERY MORNING
Qty: 30 TABLET | Refills: 1 | Status: SHIPPED | OUTPATIENT
Start: 2023-08-03 | End: 2024-08-02

## 2023-08-03 RX ORDER — BUPROPION HYDROCHLORIDE 150 MG/1
150 TABLET ORAL EVERY MORNING
Qty: 30 TABLET | Refills: 1 | Status: SHIPPED | OUTPATIENT
Start: 2023-08-03 | End: 2024-08-02

## 2023-08-03 NOTE — TELEPHONE ENCOUNTER
Patients insurance will not cover the Forfivo xl 450mg.  It is possible to send in a rx for the 300mg and 150mg?

## 2023-08-14 ENCOUNTER — TELEPHONE (OUTPATIENT)
Dept: PSYCHIATRY | Facility: CLINIC | Age: 28
End: 2023-08-14
Payer: COMMERCIAL

## 2023-08-15 RX ORDER — BREXPIPRAZOLE 4 MG/1
4 TABLET ORAL DAILY
Qty: 30 TABLET | Refills: 1 | Status: SHIPPED | OUTPATIENT
Start: 2023-08-15

## 2023-08-23 ENCOUNTER — TELEMEDICINE (OUTPATIENT)
Dept: PSYCHIATRY | Facility: CLINIC | Age: 28
End: 2023-08-23
Payer: COMMERCIAL

## 2023-08-23 DIAGNOSIS — F31.81 BIPOLAR II DISORDER: Primary | ICD-10-CM

## 2023-08-23 DIAGNOSIS — F41.1 GENERALIZED ANXIETY DISORDER: ICD-10-CM

## 2023-08-23 DIAGNOSIS — F43.10 POST TRAUMATIC STRESS DISORDER (PTSD): ICD-10-CM

## 2023-08-23 DIAGNOSIS — F43.23 ADJUSTMENT DISORDER WITH MIXED ANXIETY AND DEPRESSED MOOD: ICD-10-CM

## 2023-08-23 DIAGNOSIS — F90.0 ADHD (ATTENTION DEFICIT HYPERACTIVITY DISORDER), INATTENTIVE TYPE: ICD-10-CM

## 2023-08-23 DIAGNOSIS — F51.5 NIGHTMARES: ICD-10-CM

## 2023-08-23 RX ORDER — BUPROPION HYDROCHLORIDE 150 MG/1
150 TABLET ORAL EVERY MORNING
Qty: 30 TABLET | Refills: 1 | Status: SHIPPED | OUTPATIENT
Start: 2023-08-23 | End: 2024-08-22

## 2023-08-23 RX ORDER — BREXPIPRAZOLE 4 MG/1
4 TABLET ORAL DAILY
Qty: 30 TABLET | Refills: 1 | Status: SHIPPED | OUTPATIENT
Start: 2023-08-23

## 2023-08-23 RX ORDER — PRAZOSIN HYDROCHLORIDE 5 MG/1
5 CAPSULE ORAL NIGHTLY
Qty: 30 CAPSULE | Refills: 1 | Status: SHIPPED | OUTPATIENT
Start: 2023-08-23

## 2023-08-23 RX ORDER — BUSPIRONE HYDROCHLORIDE 30 MG/1
30 TABLET ORAL 2 TIMES DAILY
Qty: 60 TABLET | Refills: 1 | Status: SHIPPED | OUTPATIENT
Start: 2023-08-23

## 2023-08-23 RX ORDER — BUPROPION HYDROCHLORIDE 300 MG/1
300 TABLET ORAL EVERY MORNING
Qty: 30 TABLET | Refills: 1 | Status: SHIPPED | OUTPATIENT
Start: 2023-08-23 | End: 2024-08-22

## 2023-08-23 RX ORDER — CLONIDINE HYDROCHLORIDE 0.2 MG/1
0.2 TABLET ORAL NIGHTLY
Qty: 30 TABLET | Refills: 1 | Status: SHIPPED | OUTPATIENT
Start: 2023-08-23

## 2023-08-23 NOTE — PROGRESS NOTES
This provider is located at the [facility] (through [facility]), [address] [place] [address], [id number] using a secure [employer] Video Visit through [employer]. Patient is being seen remotely via telehealth at their home address in [place], and stated they are in a secure environment for this session. The patient's condition being diagnosed/treated is appropriate for telemedicine. The provider identified herself as well as her credentials.  The patient, and/or patients guardian, consent to be seen remotely, and when consent is given they understand that the consent allows for patient identifiable information to be sent to a third party as needed.   They may refuse to be seen remotely at any time. The electronic data is encrypted and password protected, and the patient and/or guardian has been advised of the potential risks to privacy not withstanding such measures.    You have chosen to receive care through a telehealth visit.  Do you consent to use a video/audio connection for your medical care today? Yes    Patient identifiers utilized: Name and date of birth.    Patient verbally confirmed consent for today's encounter:  August 23, 2023    Radha Espana is a 28 y.o. male who presents today for follow up    Chief Complaint: Anxiety, ADHD, bipolar 2       History of Present Illness:    History of Present Illness    Brandan is a 28-year-old male who I saw today for a follow-up visit via telehealth.  He tells me that since the discontinuation of lamotrigine, he feels like he is erectile dysfunction issues seem to be better.  He also tells me that he stopped taking trazodone at night due to weird dreams and nightmares.  He tells me that he is still sleeping fairly well but is not having nightmares anymore.  Denies having any flashbacks during the day.  He tells me that his mood seems to be stable and denies any lability or irritability.  He tells me that his appetite has been good but does wish that it  would decrease.  He denies any acute symptoms of depression or anxiety.  He denies any panic attacks.  He tells me that his concentration and focus seem to be better but he is making unwanted careless mistakes at work.  He tells me that he has been adjusting to working at home but has been finding out he is doing more things wrong and is worried that he will get fired.  He tells me that he has good days and bad days in regards to his energy level and sometimes does not want to get out of bed.  He denies any SI/HI/AVH.  Denies any signs or symptoms of hypomania since his last visit.     The following portions of the patient's history were reviewed and updated as appropriate: allergies, current medications, past family history, past medical history, past social history, past surgical history and problem list.    Past Psychiatric History:  Began Treatment:This year  Diagnoses:Anxiety and bipolor disorder, ADHD  Psychiatrist: Saw Dr. Beck  During IOP  Therapist: Radha malave at the [facility]  Admission History:Denies  Medication Trials: Trileptal, hydroxyzine, Klonopin, prazosin, Wellbutrin, BuSpar, Lamictal, Rexulti, and trazodone  Self Harm: Denies  Suicide Attempts:Denies      Past Medical History:  Past Medical History:   Diagnosis Date    ADHD (attention deficit hyperactivity disorder)     Depression     PTSD (post-traumatic stress disorder)        Substance Abuse History:   Types:Denies all, including illicit  Withdrawal Symptoms:Denies  Longest Period Sober:Not Applicable   AA: Not applicable     Social History:  Social History     Socioeconomic History    Marital status:    Tobacco Use    Smoking status: Never    Smokeless tobacco: Never   Substance and Sexual Activity    Alcohol use: Never    Drug use: Never    Sexual activity: Yes     Partners: Male     Birth control/protection: None       Family History:  Family History   Problem Relation Age of Onset    Anxiety disorder Mother     Depression  Mother     Depression Father        Past Surgical History:  History reviewed. No pertinent surgical history.    Problem List:  There is no problem list on file for this patient.      Allergy:   Allergies   Allergen Reactions    Meloxicam Headache     migraines    Doxycycline Rash        Current Medications:   Current Outpatient Medications   Medication Sig Dispense Refill    Brexpiprazole (Rexulti) 4 MG tablet Take 1 tablet by mouth Daily. 30 tablet 1    buPROPion XL (Wellbutrin XL) 150 MG 24 hr tablet Take 1 tablet by mouth Every Morning. 30 tablet 1    buPROPion XL (Wellbutrin XL) 300 MG 24 hr tablet Take 1 tablet by mouth Every Morning. 30 tablet 1    busPIRone (BUSPAR) 30 MG tablet Take 1 tablet by mouth 2 (Two) Times a Day. 60 tablet 1    cloNIDine (CATAPRES) 0.2 MG tablet Take 1 tablet by mouth Every Night. 30 tablet 1    prazosin (MINIPRESS) 5 MG capsule Take 1 capsule by mouth Every Night. 30 capsule 1    cholecalciferol (VITAMIN D3) 1.25 MG (85892 UT) capsule Take 1 capsule by mouth Every 7 (Seven) Days. 4 capsule 11    clonazePAM (KlonoPIN) 1 MG tablet Take 1 tablet by mouth Daily As Needed.      hydrOXYzine (ATARAX) 25 MG tablet Take 1 tablet by mouth 3 (Three) Times a Day As Needed for Anxiety. 90 tablet 1    ibuprofen (ADVIL,MOTRIN) 800 MG tablet Take 1 tablet by mouth Every 8 (Eight) Hours As Needed. for pain      omeprazole (priLOSEC) 40 MG capsule        No current facility-administered medications for this visit.       Review of Systems:    Review of Systems   Constitutional:  Positive for activity change and fatigue.   Neurological:  Negative for seizures.   Psychiatric/Behavioral:  Positive for decreased concentration, sleep disturbance and stress. Negative for agitation, behavioral problems, dysphoric mood, hallucinations, self-injury, suicidal ideas, negative for hyperactivity and depressed mood. The patient is nervous/anxious.        Physical Exam:   Physical Exam  Constitutional:        Appearance: Normal appearance.   Pulmonary:      Effort: Pulmonary effort is normal.   Musculoskeletal:         General: Normal range of motion.      Cervical back: Normal range of motion.   Neurological:      Mental Status: He is alert.   Psychiatric:         Attention and Perception: He is inattentive.         Mood and Affect: Affect normal. Mood is anxious.         Speech: Speech normal.         Behavior: Behavior normal. Behavior is cooperative.         Thought Content: Thought content normal.         Cognition and Memory: Cognition normal. He exhibits impaired recent memory.         Judgment: Judgment normal.       Vitals:  There were no vitals taken for this visit. There is no height or weight on file to calculate BMI.  Due to extenuating circumstances and possible current health risks associated with the patient being present in a clinical setting (with current health restrictions in place in regards to possible COVID 19 transmission/exposure), the patient was seen remotely today via a MyChart Video Visit through [employer].  Unable to obtain vital signs due to nature of remote visit.      Last 3 Blood Pressure Readings:  BP Readings from Last 3 Encounters:   04/17/23 128/80       Mental Status Exam:   Hygiene:   good  Cooperation:  Cooperative  Eye Contact:  Good  Psychomotor Behavior:  Appropriate  Affect:  Full range  Mood: anxious  Hopelessness: Denies  Speech:  Normal  Thought Process:  Linear  Thought Content:  Normal  Suicidal:  None  Homicidal:  None  Hallucinations:  None  Delusion:  None  Memory:  Intact  Orientation:  Person, Place, Time, and Situation  Reliability:  good  Insight:  Good  Judgement:  Good  Impulse Control:  Fair  Physical/Medical Issues:  Yes see past medical history        Lab Results:   No visits with results within 3 Month(s) from this visit.   Latest known visit with results is:   Lab on 04/17/2023   Component Date Value Ref Range Status    Glucose 04/17/2023 81  65 - 99 mg/dL Final     BUN 04/17/2023 17  6 - 20 mg/dL Final    Creatinine 04/17/2023 1.04  0.76 - 1.27 mg/dL Final    Sodium 04/17/2023 141  136 - 145 mmol/L Final    Potassium 04/17/2023 4.3  3.5 - 5.2 mmol/L Final    Chloride 04/17/2023 106  98 - 107 mmol/L Final    CO2 04/17/2023 25.0  22.0 - 29.0 mmol/L Final    Calcium 04/17/2023 9.1  8.6 - 10.5 mg/dL Final    Total Protein 04/17/2023 7.1  6.0 - 8.5 g/dL Final    Albumin 04/17/2023 4.6  3.5 - 5.2 g/dL Final    ALT (SGPT) 04/17/2023 51 (H)  1 - 41 U/L Final    AST (SGOT) 04/17/2023 27  1 - 40 U/L Final    Alkaline Phosphatase 04/17/2023 67  39 - 117 U/L Final    Total Bilirubin 04/17/2023 0.5  0.0 - 1.2 mg/dL Final    Globulin 04/17/2023 2.5  gm/dL Final    A/G Ratio 04/17/2023 1.8  g/dL Final    BUN/Creatinine Ratio 04/17/2023 16.3  7.0 - 25.0 Final    Anion Gap 04/17/2023 10.0  5.0 - 15.0 mmol/L Final    eGFR 04/17/2023 101.6  >60.0 mL/min/1.73 Final    Total Cholesterol 04/17/2023 253 (H)  0 - 200 mg/dL Final    Triglycerides 04/17/2023 125  0 - 150 mg/dL Final    HDL Cholesterol 04/17/2023 73 (H)  40 - 60 mg/dL Final    LDL Cholesterol  04/17/2023 158 (H)  0 - 100 mg/dL Final    VLDL Cholesterol 04/17/2023 22  5 - 40 mg/dL Final    LDL/HDL Ratio 04/17/2023 2.12   Final    TSH 04/17/2023 1.470  0.270 - 4.200 uIU/mL Final    Free T4 04/17/2023 1.19  0.93 - 1.70 ng/dL Final    Vitamin B-12 04/17/2023 439  211 - 946 pg/mL Final    25 Hydroxy, Vitamin D 04/17/2023 69.4  30.0 - 100.0 ng/ml Final    WBC 04/17/2023 6.11  3.40 - 10.80 10*3/mm3 Final    RBC 04/17/2023 5.79  4.14 - 5.80 10*6/mm3 Final    Hemoglobin 04/17/2023 14.8  13.0 - 17.7 g/dL Final    Hematocrit 04/17/2023 44.8  37.5 - 51.0 % Final    MCV 04/17/2023 77.4 (L)  79.0 - 97.0 fL Final    MCH 04/17/2023 25.6 (L)  26.6 - 33.0 pg Final    MCHC 04/17/2023 33.0  31.5 - 35.7 g/dL Final    RDW 04/17/2023 13.2  12.3 - 15.4 % Final    RDW-SD 04/17/2023 36.8 (L)  37.0 - 54.0 fl Final    MPV 04/17/2023 10.3  6.0 - 12.0 fL Final     Platelets 04/17/2023 168  140 - 450 10*3/mm3 Final    Neutrophil % 04/17/2023 52.9  42.7 - 76.0 % Final    Lymphocyte % 04/17/2023 39.1  19.6 - 45.3 % Final    Monocyte % 04/17/2023 7.2  5.0 - 12.0 % Final    Eosinophil % 04/17/2023 0.3  0.3 - 6.2 % Final    Basophil % 04/17/2023 0.2  0.0 - 1.5 % Final    Immature Grans % 04/17/2023 0.3  0.0 - 0.5 % Final    Neutrophils, Absolute 04/17/2023 3.23  1.70 - 7.00 10*3/mm3 Final    Lymphocytes, Absolute 04/17/2023 2.39  0.70 - 3.10 10*3/mm3 Final    Monocytes, Absolute 04/17/2023 0.44  0.10 - 0.90 10*3/mm3 Final    Eosinophils, Absolute 04/17/2023 0.02  0.00 - 0.40 10*3/mm3 Final    Basophils, Absolute 04/17/2023 0.01  0.00 - 0.20 10*3/mm3 Final    Immature Grans, Absolute 04/17/2023 0.02  0.00 - 0.05 10*3/mm3 Final    nRBC 04/17/2023 0.0  0.0 - 0.2 /100 WBC Final         Assessment & Plan   Problems Addressed this Visit    None  Visit Diagnoses       Bipolar II disorder    -  Primary    Relevant Medications    busPIRone (BUSPAR) 30 MG tablet    Brexpiprazole (Rexulti) 4 MG tablet    buPROPion XL (Wellbutrin XL) 150 MG 24 hr tablet    buPROPion XL (Wellbutrin XL) 300 MG 24 hr tablet    ADHD (attention deficit hyperactivity disorder), inattentive type        Relevant Medications    busPIRone (BUSPAR) 30 MG tablet    Brexpiprazole (Rexulti) 4 MG tablet    buPROPion XL (Wellbutrin XL) 150 MG 24 hr tablet    buPROPion XL (Wellbutrin XL) 300 MG 24 hr tablet    Adjustment disorder with mixed anxiety and depressed mood        Relevant Medications    busPIRone (BUSPAR) 30 MG tablet    Brexpiprazole (Rexulti) 4 MG tablet    buPROPion XL (Wellbutrin XL) 150 MG 24 hr tablet    buPROPion XL (Wellbutrin XL) 300 MG 24 hr tablet    Post traumatic stress disorder (PTSD)        Relevant Medications    busPIRone (BUSPAR) 30 MG tablet    Brexpiprazole (Rexulti) 4 MG tablet    buPROPion XL (Wellbutrin XL) 150 MG 24 hr tablet    buPROPion XL (Wellbutrin XL) 300 MG 24 hr tablet    Generalized  anxiety disorder        Relevant Medications    busPIRone (BUSPAR) 30 MG tablet    cloNIDine (CATAPRES) 0.2 MG tablet    Brexpiprazole (Rexulti) 4 MG tablet    buPROPion XL (Wellbutrin XL) 150 MG 24 hr tablet    buPROPion XL (Wellbutrin XL) 300 MG 24 hr tablet    Nightmares        Relevant Medications    busPIRone (BUSPAR) 30 MG tablet    prazosin (MINIPRESS) 5 MG capsule    Brexpiprazole (Rexulti) 4 MG tablet    buPROPion XL (Wellbutrin XL) 150 MG 24 hr tablet    buPROPion XL (Wellbutrin XL) 300 MG 24 hr tablet          Diagnoses         Codes Comments    Bipolar II disorder    -  Primary ICD-10-CM: F31.81  ICD-9-CM: 296.89     ADHD (attention deficit hyperactivity disorder), inattentive type     ICD-10-CM: F90.0  ICD-9-CM: 314.00     Adjustment disorder with mixed anxiety and depressed mood     ICD-10-CM: F43.23  ICD-9-CM: 309.28     Post traumatic stress disorder (PTSD)     ICD-10-CM: F43.10  ICD-9-CM: 309.81     Generalized anxiety disorder     ICD-10-CM: F41.1  ICD-9-CM: 300.02     Nightmares     ICD-10-CM: F51.5  ICD-9-CM: 307.47             Visit Diagnoses:    ICD-10-CM ICD-9-CM   1. Bipolar II disorder  F31.81 296.89   2. ADHD (attention deficit hyperactivity disorder), inattentive type  F90.0 314.00   3. Adjustment disorder with mixed anxiety and depressed mood  F43.23 309.28   4. Post traumatic stress disorder (PTSD)  F43.10 309.81   5. Generalized anxiety disorder  F41.1 300.02   6. Nightmares  F51.5 307.47         GOALS:  Short Term Goals: Patient will be compliant with medication, and patient will have no significant medication related side effects.  Patient will be engaged in psychotherapy as indicated.  Patient will report subjective improvement of symptoms.  Long term goals: To stabilize mood and treat/improve subjective symptoms, the patient will stay out of the hospital, the patient will be at an optimal level of functioning, and the patient will take all medications as prescribed.  The  patient/guardian verbalized understanding and agreement with goals that were mutually set.      TREATMENT PLAN: Continue supportive psychotherapy efforts and medications as indicated.  Pharmacological and Non-Pharmacological treatment options discussed during today's visit. Patient/Guardian acknowledged and verbally consented with current treatment plan and was educated on the importance of compliance with treatment and follow-up appointments.      MEDICATION ISSUES:  Discussed medication options and treatment plan of prescribed medication as well as the risks, benefits, any black box warnings, and side effects including potential falls, possible impaired driving, and metabolic adversities among others. Patient is agreeable to call the office with any worsening of symptoms or onset of side effects, or if any concerns or questions arise.  The contact information for the office is made available to the patient. Patient is agreeable to call 911 or go to the nearest ER should they begin having any SI/HI, or if any urgent concerns arise. No medication side effects or related complaints today.     MEDS ORDERED DURING VISIT:  New Medications Ordered This Visit   Medications    busPIRone (BUSPAR) 30 MG tablet     Sig: Take 1 tablet by mouth 2 (Two) Times a Day.     Dispense:  60 tablet     Refill:  1    cloNIDine (CATAPRES) 0.2 MG tablet     Sig: Take 1 tablet by mouth Every Night.     Dispense:  30 tablet     Refill:  1    prazosin (MINIPRESS) 5 MG capsule     Sig: Take 1 capsule by mouth Every Night.     Dispense:  30 capsule     Refill:  1    Brexpiprazole (Rexulti) 4 MG tablet     Sig: Take 1 tablet by mouth Daily.     Dispense:  30 tablet     Refill:  1    buPROPion XL (Wellbutrin XL) 150 MG 24 hr tablet     Sig: Take 1 tablet by mouth Every Morning.     Dispense:  30 tablet     Refill:  1    buPROPion XL (Wellbutrin XL) 300 MG 24 hr tablet     Sig: Take 1 tablet by mouth Every Morning.     Dispense:  30 tablet      Refill:  1     Plan:  - Discontinue trazodone to 100 mg p.o. nightly. Patient reports this caused him to have nightmares.   - Continue Wellbutrin XL  450 mg p.o. daily.- Prescribed 300mg and 150mg tablet.   - Continue Rexulti 4 mg p.o. daily.  - Continue prazosin 5 mg p.o. nightly.  - Continue clonidine 0.2 mg p.o. nightly.  - Continue BuSpar 30 mg p.o. twice daily.  - Follow-up in 6 weeks via video visit.    Follow Up Appointment:   Return in about 6 weeks (around 10/4/2023) for Recheck, Video visit.             This document has been electronically signed by PACHECO Salter  August 23, 2023 08:29 EDT    Dictated Utilizing Dragon Dictation: Part of this note may be an electronic transcription/translation of spoken language to printed text using the Dragon Dictation System.

## 2023-10-04 ENCOUNTER — TELEMEDICINE (OUTPATIENT)
Dept: PSYCHIATRY | Facility: CLINIC | Age: 28
End: 2023-10-04
Payer: COMMERCIAL

## 2023-10-04 DIAGNOSIS — F90.0 ADHD (ATTENTION DEFICIT HYPERACTIVITY DISORDER), INATTENTIVE TYPE: ICD-10-CM

## 2023-10-04 DIAGNOSIS — F43.10 POST TRAUMATIC STRESS DISORDER (PTSD): ICD-10-CM

## 2023-10-04 DIAGNOSIS — F41.1 GENERALIZED ANXIETY DISORDER: ICD-10-CM

## 2023-10-04 DIAGNOSIS — F31.81 BIPOLAR II DISORDER: Primary | ICD-10-CM

## 2023-10-04 NOTE — PROGRESS NOTES
This provider is located at the [facility] (through [facility]), [address] [place] [address], [id number] using a secure [employer] Video Visit through [employer]. Patient is being seen remotely via telehealth at their home address in [place], and stated they are in a secure environment for this session. The patient's condition being diagnosed/treated is appropriate for telemedicine. The provider identified herself as well as her credentials.  The patient, and/or patients guardian, consent to be seen remotely, and when consent is given they understand that the consent allows for patient identifiable information to be sent to a third party as needed.   They may refuse to be seen remotely at any time. The electronic data is encrypted and password protected, and the patient and/or guardian has been advised of the potential risks to privacy not withstanding such measures.    You have chosen to receive care through a telehealth visit.  Do you consent to use a video/audio connection for your medical care today? Yes    Patient identifiers utilized: Name and date of birth.    Patient verbally confirmed consent for today's encounter:  October 4, 2023    Subjective   Lloyd Espana is a 28 y.o. male who presents today for follow up    Chief Complaint: Anxiety, ADHD, bipolar 2       History of Present Illness:    History of Present Illness      Brandan is a 28-year-old male who I saw today for a follow-up visit via telehealth. He tells me that he has been doing well as far as his mood. He tells me that work is going okay and does not report making as many mistakes. 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 He denies any acute issues with depression or anxiety. He tells me that he has been having strange dreams but denies any night reyes and tells me that he is sleeping good otherwise. Denies any troubling flashbacks during the day. Denies any side effects to medications. He tells me that he is mentally exhausted  after work. He works 10:30 am to 7 pm. He tells me that his focus and concentration have been okay. He has some good days and bad days. He denies having any issues with irritability.  He reports having adequate energy during the day but tells me that he feels mentally exhausted after work most evenings. He denies SI/HI/AVH at this time.         The following portions of the patient's history were reviewed and updated as appropriate: allergies, current medications, past family history, past medical history, past social history, past surgical history and problem list.    Past Psychiatric History:  Began Treatment:This year  Diagnoses:Anxiety and bipolor disorder, ADHD  Psychiatrist: Saw Dr. Beck  During IOP  Therapist: Radha malave at the [facility]  Admission History:Denies  Medication Trials: Trileptal, hydroxyzine, Klonopin, prazosin, Wellbutrin, BuSpar, Lamictal, Rexulti, and trazodone  Self Harm: Denies  Suicide Attempts:Denies      Past Medical History:  Past Medical History:   Diagnosis Date    ADHD (attention deficit hyperactivity disorder)     Depression     PTSD (post-traumatic stress disorder)        Substance Abuse History:   Types:Denies all, including illicit  Withdrawal Symptoms:Denies  Longest Period Sober:Not Applicable   AA: Not applicable     Social History:  Social History     Socioeconomic History    Marital status:    Tobacco Use    Smoking status: Never    Smokeless tobacco: Never   Substance and Sexual Activity    Alcohol use: Never    Drug use: Never    Sexual activity: Yes     Partners: Male     Birth control/protection: None       Family History:  Family History   Problem Relation Age of Onset    Anxiety disorder Mother     Depression Mother     Depression Father        Past Surgical History:  No past surgical history on file.    Problem List:  There is no problem list on file for this patient.      Allergy:   Allergies   Allergen Reactions    Meloxicam Headache     migraines     Doxycycline Rash        Current Medications:   Current Outpatient Medications   Medication Sig Dispense Refill    Brexpiprazole (Rexulti) 4 MG tablet Take 1 tablet by mouth Daily. 30 tablet 1    buPROPion XL (Wellbutrin XL) 150 MG 24 hr tablet Take 1 tablet by mouth Every Morning. 30 tablet 1    buPROPion XL (Wellbutrin XL) 300 MG 24 hr tablet Take 1 tablet by mouth Every Morning. 30 tablet 1    busPIRone (BUSPAR) 30 MG tablet Take 1 tablet by mouth 2 (Two) Times a Day. 60 tablet 1    cholecalciferol (VITAMIN D3) 1.25 MG (02297 UT) capsule Take 1 capsule by mouth Every 7 (Seven) Days. 4 capsule 11    clonazePAM (KlonoPIN) 1 MG tablet Take 1 tablet by mouth Daily As Needed.      cloNIDine (CATAPRES) 0.2 MG tablet Take 1 tablet by mouth Every Night. 30 tablet 1    hydrOXYzine (ATARAX) 25 MG tablet Take 1 tablet by mouth 3 (Three) Times a Day As Needed for Anxiety. 90 tablet 1    ibuprofen (ADVIL,MOTRIN) 800 MG tablet Take 1 tablet by mouth Every 8 (Eight) Hours As Needed. for pain      omeprazole (priLOSEC) 40 MG capsule       prazosin (MINIPRESS) 5 MG capsule Take 1 capsule by mouth Every Night. 30 capsule 1     No current facility-administered medications for this visit.       Review of Systems:    Review of Systems   Constitutional:  Positive for fatigue.   Neurological:  Negative for seizures.   Psychiatric/Behavioral:  Positive for stress. Negative for agitation, behavioral problems, decreased concentration, dysphoric mood, hallucinations, self-injury, sleep disturbance, suicidal ideas, negative for hyperactivity and depressed mood. The patient is nervous/anxious.        Physical Exam:   Physical Exam  Constitutional:       Appearance: Normal appearance.   Pulmonary:      Effort: Pulmonary effort is normal.   Musculoskeletal:         General: Normal range of motion.      Cervical back: Normal range of motion.   Neurological:      Mental Status: He is alert.   Psychiatric:         Attention and Perception: He is  attentive.         Mood and Affect: Mood and affect normal.         Speech: Speech normal.         Behavior: Behavior normal. Behavior is cooperative.         Thought Content: Thought content normal.         Cognition and Memory: Cognition and memory normal.         Judgment: Judgment normal.       Vitals:  There were no vitals taken for this visit. There is no height or weight on file to calculate BMI.  Due to extenuating circumstances and possible current health risks associated with the patient being present in a clinical setting (with current health restrictions in place in regards to possible COVID 19 transmission/exposure), the patient was seen remotely today via a MyChart Video Visit through [employer].  Unable to obtain vital signs due to nature of remote visit.      Last 3 Blood Pressure Readings:  BP Readings from Last 3 Encounters:   04/17/23 128/80       Mental Status Exam:   Hygiene:   good  Cooperation:  Cooperative  Eye Contact:  Good  Psychomotor Behavior:  Appropriate  Affect:  Full range  Mood: anxious  Hopelessness: Denies  Speech:  Normal  Thought Process:  Linear  Thought Content:  Normal  Suicidal:  None  Homicidal:  None  Hallucinations:  None  Delusion:  None  Memory:  Intact  Orientation:  Person, Place, Time, and Situation  Reliability:  good  Insight:  Good  Judgement:  Good  Impulse Control:  Fair  Physical/Medical Issues:  Yes see past medical history        Lab Results:   No visits with results within 3 Month(s) from this visit.   Latest known visit with results is:   Lab on 04/17/2023   Component Date Value Ref Range Status    Glucose 04/17/2023 81  65 - 99 mg/dL Final    BUN 04/17/2023 17  6 - 20 mg/dL Final    Creatinine 04/17/2023 1.04  0.76 - 1.27 mg/dL Final    Sodium 04/17/2023 141  136 - 145 mmol/L Final    Potassium 04/17/2023 4.3  3.5 - 5.2 mmol/L Final    Chloride 04/17/2023 106  98 - 107 mmol/L Final    CO2 04/17/2023 25.0  22.0 - 29.0 mmol/L Final    Calcium 04/17/2023 9.1  8.6 -  10.5 mg/dL Final    Total Protein 04/17/2023 7.1  6.0 - 8.5 g/dL Final    Albumin 04/17/2023 4.6  3.5 - 5.2 g/dL Final    ALT (SGPT) 04/17/2023 51 (H)  1 - 41 U/L Final    AST (SGOT) 04/17/2023 27  1 - 40 U/L Final    Alkaline Phosphatase 04/17/2023 67  39 - 117 U/L Final    Total Bilirubin 04/17/2023 0.5  0.0 - 1.2 mg/dL Final    Globulin 04/17/2023 2.5  gm/dL Final    A/G Ratio 04/17/2023 1.8  g/dL Final    BUN/Creatinine Ratio 04/17/2023 16.3  7.0 - 25.0 Final    Anion Gap 04/17/2023 10.0  5.0 - 15.0 mmol/L Final    eGFR 04/17/2023 101.6  >60.0 mL/min/1.73 Final    Total Cholesterol 04/17/2023 253 (H)  0 - 200 mg/dL Final    Triglycerides 04/17/2023 125  0 - 150 mg/dL Final    HDL Cholesterol 04/17/2023 73 (H)  40 - 60 mg/dL Final    LDL Cholesterol  04/17/2023 158 (H)  0 - 100 mg/dL Final    VLDL Cholesterol 04/17/2023 22  5 - 40 mg/dL Final    LDL/HDL Ratio 04/17/2023 2.12   Final    TSH 04/17/2023 1.470  0.270 - 4.200 uIU/mL Final    Free T4 04/17/2023 1.19  0.93 - 1.70 ng/dL Final    Vitamin B-12 04/17/2023 439  211 - 946 pg/mL Final    25 Hydroxy, Vitamin D 04/17/2023 69.4  30.0 - 100.0 ng/ml Final    WBC 04/17/2023 6.11  3.40 - 10.80 10*3/mm3 Final    RBC 04/17/2023 5.79  4.14 - 5.80 10*6/mm3 Final    Hemoglobin 04/17/2023 14.8  13.0 - 17.7 g/dL Final    Hematocrit 04/17/2023 44.8  37.5 - 51.0 % Final    MCV 04/17/2023 77.4 (L)  79.0 - 97.0 fL Final    MCH 04/17/2023 25.6 (L)  26.6 - 33.0 pg Final    MCHC 04/17/2023 33.0  31.5 - 35.7 g/dL Final    RDW 04/17/2023 13.2  12.3 - 15.4 % Final    RDW-SD 04/17/2023 36.8 (L)  37.0 - 54.0 fl Final    MPV 04/17/2023 10.3  6.0 - 12.0 fL Final    Platelets 04/17/2023 168  140 - 450 10*3/mm3 Final    Neutrophil % 04/17/2023 52.9  42.7 - 76.0 % Final    Lymphocyte % 04/17/2023 39.1  19.6 - 45.3 % Final    Monocyte % 04/17/2023 7.2  5.0 - 12.0 % Final    Eosinophil % 04/17/2023 0.3  0.3 - 6.2 % Final    Basophil % 04/17/2023 0.2  0.0 - 1.5 % Final    Immature Grans %  04/17/2023 0.3  0.0 - 0.5 % Final    Neutrophils, Absolute 04/17/2023 3.23  1.70 - 7.00 10*3/mm3 Final    Lymphocytes, Absolute 04/17/2023 2.39  0.70 - 3.10 10*3/mm3 Final    Monocytes, Absolute 04/17/2023 0.44  0.10 - 0.90 10*3/mm3 Final    Eosinophils, Absolute 04/17/2023 0.02  0.00 - 0.40 10*3/mm3 Final    Basophils, Absolute 04/17/2023 0.01  0.00 - 0.20 10*3/mm3 Final    Immature Grans, Absolute 04/17/2023 0.02  0.00 - 0.05 10*3/mm3 Final    nRBC 04/17/2023 0.0  0.0 - 0.2 /100 WBC Final         Assessment & Plan   Problems Addressed this Visit    None  Visit Diagnoses       Bipolar II disorder    -  Primary    ADHD (attention deficit hyperactivity disorder), inattentive type        Post traumatic stress disorder (PTSD)        Generalized anxiety disorder              Diagnoses         Codes Comments    Bipolar II disorder    -  Primary ICD-10-CM: F31.81  ICD-9-CM: 296.89     ADHD (attention deficit hyperactivity disorder), inattentive type     ICD-10-CM: F90.0  ICD-9-CM: 314.00     Post traumatic stress disorder (PTSD)     ICD-10-CM: F43.10  ICD-9-CM: 309.81     Generalized anxiety disorder     ICD-10-CM: F41.1  ICD-9-CM: 300.02             Visit Diagnoses:    ICD-10-CM ICD-9-CM   1. Bipolar II disorder  F31.81 296.89   2. ADHD (attention deficit hyperactivity disorder), inattentive type  F90.0 314.00   3. Post traumatic stress disorder (PTSD)  F43.10 309.81   4. Generalized anxiety disorder  F41.1 300.02         GOALS:  Short Term Goals: Patient will be compliant with medication, and patient will have no significant medication related side effects.  Patient will be engaged in psychotherapy as indicated.  Patient will report subjective improvement of symptoms.  Long term goals: To stabilize mood and treat/improve subjective symptoms, the patient will stay out of the hospital, the patient will be at an optimal level of functioning, and the patient will take all medications as prescribed.  The patient/guardian  verbalized understanding and agreement with goals that were mutually set.      TREATMENT PLAN: Continue supportive psychotherapy efforts and medications as indicated.  Pharmacological and Non-Pharmacological treatment options discussed during today's visit. Patient/Guardian acknowledged and verbally consented with current treatment plan and was educated on the importance of compliance with treatment and follow-up appointments.      MEDICATION ISSUES:  Discussed medication options and treatment plan of prescribed medication as well as the risks, benefits, any black box warnings, and side effects including potential falls, possible impaired driving, and metabolic adversities among others. Patient is agreeable to call the office with any worsening of symptoms or onset of side effects, or if any concerns or questions arise.  The contact information for the office is made available to the patient. Patient is agreeable to call 911 or go to the nearest ER should they begin having any SI/HI, or if any urgent concerns arise. No medication side effects or related complaints today.     MEDS ORDERED DURING VISIT:  No orders of the defined types were placed in this encounter.    Plan:    - Continue Wellbutrin XL  450 mg p.o. daily.- Prescribed 300mg and 150mg tablet.   - Continue Rexulti 4 mg p.o. daily.  - Continue prazosin 5 mg p.o. nightly.  - Continue clonidine 0.2 mg p.o. nightly.  - Continue BuSpar 30 mg p.o. twice daily.  - Follow-up in 8 weeks via video visit.    Follow Up Appointment:   Return in about 2 months (around 12/4/2023) for Recheck, Video visit.             This document has been electronically signed by PACHECO Salter  October 4, 2023 08:49 EDT    Dictated Utilizing Dragon Dictation: Part of this note may be an electronic transcription/translation of spoken language to printed text using the Dragon Dictation System.

## 2023-10-31 RX ORDER — BUPROPION HYDROCHLORIDE 300 MG/1
300 TABLET ORAL EVERY MORNING
Qty: 30 TABLET | Refills: 1 | Status: SHIPPED | OUTPATIENT
Start: 2023-10-31 | End: 2024-10-30

## 2023-11-08 DIAGNOSIS — F51.5 NIGHTMARES: ICD-10-CM

## 2023-11-08 RX ORDER — PRAZOSIN HYDROCHLORIDE 5 MG/1
5 CAPSULE ORAL NIGHTLY
Qty: 30 CAPSULE | Refills: 1 | Status: SHIPPED | OUTPATIENT
Start: 2023-11-08

## 2023-11-15 DIAGNOSIS — F41.1 GENERALIZED ANXIETY DISORDER: ICD-10-CM

## 2023-11-15 RX ORDER — BUSPIRONE HYDROCHLORIDE 30 MG/1
30 TABLET ORAL 2 TIMES DAILY
Qty: 60 TABLET | Refills: 1 | Status: SHIPPED | OUTPATIENT
Start: 2023-11-15

## 2023-11-15 RX ORDER — BUPROPION HYDROCHLORIDE 150 MG/1
150 TABLET ORAL EVERY MORNING
Qty: 30 TABLET | Refills: 1 | Status: SHIPPED | OUTPATIENT
Start: 2023-11-15 | End: 2024-11-14

## 2023-12-05 ENCOUNTER — TELEMEDICINE (OUTPATIENT)
Dept: PSYCHIATRY | Facility: CLINIC | Age: 28
End: 2023-12-05
Payer: COMMERCIAL

## 2023-12-05 DIAGNOSIS — F90.0 ADHD (ATTENTION DEFICIT HYPERACTIVITY DISORDER), INATTENTIVE TYPE: Primary | ICD-10-CM

## 2023-12-05 DIAGNOSIS — F51.5 NIGHTMARES: ICD-10-CM

## 2023-12-05 DIAGNOSIS — F43.10 POST TRAUMATIC STRESS DISORDER (PTSD): ICD-10-CM

## 2023-12-05 DIAGNOSIS — F41.1 GENERALIZED ANXIETY DISORDER: ICD-10-CM

## 2023-12-05 DIAGNOSIS — F31.81 BIPOLAR II DISORDER: ICD-10-CM

## 2023-12-05 PROCEDURE — 99214 OFFICE O/P EST MOD 30 MIN: CPT

## 2023-12-05 RX ORDER — BUPROPION HYDROCHLORIDE 300 MG/1
300 TABLET ORAL EVERY MORNING
Qty: 30 TABLET | Refills: 1 | Status: SHIPPED | OUTPATIENT
Start: 2023-12-05 | End: 2024-12-04

## 2023-12-05 RX ORDER — BUPROPION HYDROCHLORIDE 150 MG/1
150 TABLET ORAL EVERY MORNING
Qty: 30 TABLET | Refills: 1 | Status: SHIPPED | OUTPATIENT
Start: 2023-12-05 | End: 2024-12-04

## 2023-12-05 RX ORDER — CLONIDINE HYDROCHLORIDE 0.2 MG/1
0.2 TABLET ORAL NIGHTLY
Qty: 30 TABLET | Refills: 1 | Status: SHIPPED | OUTPATIENT
Start: 2023-12-05

## 2023-12-05 RX ORDER — BREXPIPRAZOLE 4 MG/1
4 TABLET ORAL DAILY
Qty: 30 TABLET | Refills: 1 | Status: SHIPPED | OUTPATIENT
Start: 2023-12-05

## 2023-12-05 RX ORDER — HYDROXYZINE HYDROCHLORIDE 25 MG/1
25 TABLET, FILM COATED ORAL 3 TIMES DAILY PRN
Qty: 90 TABLET | Refills: 1 | Status: SHIPPED | OUTPATIENT
Start: 2023-12-05

## 2023-12-05 RX ORDER — ATOMOXETINE 40 MG/1
40 CAPSULE ORAL 2 TIMES DAILY
Qty: 60 CAPSULE | Refills: 1 | Status: SHIPPED | OUTPATIENT
Start: 2023-12-05

## 2023-12-05 RX ORDER — PRAZOSIN HYDROCHLORIDE 5 MG/1
5 CAPSULE ORAL NIGHTLY
Qty: 30 CAPSULE | Refills: 1 | Status: SHIPPED | OUTPATIENT
Start: 2023-12-05

## 2023-12-05 RX ORDER — BUSPIRONE HYDROCHLORIDE 30 MG/1
30 TABLET ORAL 2 TIMES DAILY
Qty: 60 TABLET | Refills: 1 | Status: SHIPPED | OUTPATIENT
Start: 2023-12-05

## 2023-12-05 NOTE — PROGRESS NOTES
This provider is located at the [facility] (through [facility]), [address] [place] [address], [id number] using a secure [employer] Video Visit through "[employer]". Patient is being seen remotely via telehealth at their home address in [place], and stated they are in a secure environment for this session. The patient's condition being diagnosed/treated is appropriate for telemedicine. The provider identified herself as well as her credentials.  The patient, and/or patients guardian, consent to be seen remotely, and when consent is given they understand that the consent allows for patient identifiable information to be sent to a third party as needed.   They may refuse to be seen remotely at any time. The electronic data is encrypted and password protected, and the patient and/or guardian has been advised of the potential risks to privacy not withstanding such measures.    You have chosen to receive care through a telehealth visit.  Do you consent to use a video/audio connection for your medical care today? Yes    Patient identifiers utilized: Name and date of birth.    Patient verbally confirmed consent for today's encounter:  December 5, 2023    Subjective   Lloyd Espana is a 28 y.o. male who presents today for follow up    Chief Complaint: Anxiety, ADHD, bipolar 2       History of Present Illness:    History of Present Illness      Brandan is a 28-year-old male who I saw today for a follow-up visit via telehealth. He tells me that his anxiety has been up a little and has been having to take hydroyzine frequently. He has also felt more depressed lately. He tells me that he has had to take a couple of days off of work due to these mood symptoms. 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 Sleep has been fair, he states that he stays exhausted. He states that concentration and focus have been difficult at times. Irritability is happening on  occasion and patient's spouse also states that he has noticed. He tells me that his nightmares have been better and has hasn't had any lately.  Reports having a fair appetite.  He does experience a lot of fatigue but tells me that he has been very stressed out lately.           The following portions of the patient's history were reviewed and updated as appropriate: allergies, current medications, past family history, past medical history, past social history, past surgical history and problem list.    Past Psychiatric History:  Began Treatment:This year  Diagnoses:Anxiety and bipolor disorder, ADHD  Psychiatrist: Saw Dr. Beck  During IOP  Therapist: Radha malave at the [facility]  Admission History:Denies  Medication Trials: Trileptal, hydroxyzine, Klonopin, prazosin, Wellbutrin, BuSpar, Lamictal, Rexulti, and trazodone  Self Harm: Denies  Suicide Attempts:Denies      Past Medical History:  Past Medical History:   Diagnosis Date    ADHD (attention deficit hyperactivity disorder)     Depression     PTSD (post-traumatic stress disorder)        Substance Abuse History:   Types:Denies all, including illicit  Withdrawal Symptoms:Denies  Longest Period Sober:Not Applicable   AA: Not applicable     Social History:  Social History     Socioeconomic History    Marital status:    Tobacco Use    Smoking status: Never    Smokeless tobacco: Never   Substance and Sexual Activity    Alcohol use: Never    Drug use: Never    Sexual activity: Yes     Partners: Male     Birth control/protection: None       Family History:  Family History   Problem Relation Age of Onset    Anxiety disorder Mother     Depression Mother     Depression Father        Past Surgical History:  No past surgical history on file.    Problem List:  There is no problem list on file for this patient.      Allergy:   Allergies   Allergen Reactions    Meloxicam Headache     migraines    Doxycycline Rash        Current Medications:   Current Outpatient  Medications   Medication Sig Dispense Refill    Brexpiprazole (Rexulti) 4 MG tablet Take 1 tablet by mouth Daily. 30 tablet 1    buPROPion XL (Wellbutrin XL) 150 MG 24 hr tablet Take 1 tablet by mouth Every Morning. 30 tablet 1    buPROPion XL (Wellbutrin XL) 300 MG 24 hr tablet Take 1 tablet by mouth Every Morning. 30 tablet 1    busPIRone (BUSPAR) 30 MG tablet Take 1 tablet by mouth 2 (Two) Times a Day. 60 tablet 1    cloNIDine (CATAPRES) 0.2 MG tablet Take 1 tablet by mouth Every Night. 30 tablet 1    hydrOXYzine (ATARAX) 25 MG tablet Take 1 tablet by mouth 3 (Three) Times a Day As Needed for Anxiety. 90 tablet 1    prazosin (MINIPRESS) 5 MG capsule Take 1 capsule by mouth Every Night. 30 capsule 1    atomoxetine (Strattera) 40 MG capsule Take 1 capsule by mouth 2 (Two) Times a Day. Take one capsule by mouth for one week then increase to one capsule by mouth twice daily. 60 capsule 1    cholecalciferol (VITAMIN D3) 1.25 MG (26002 UT) capsule Take 1 capsule by mouth Every 7 (Seven) Days. 4 capsule 11    clonazePAM (KlonoPIN) 1 MG tablet Take 1 tablet by mouth Daily As Needed.      ibuprofen (ADVIL,MOTRIN) 800 MG tablet Take 1 tablet by mouth Every 8 (Eight) Hours As Needed. for pain      omeprazole (priLOSEC) 40 MG capsule        No current facility-administered medications for this visit.       Review of Systems:    Review of Systems   Constitutional:  Positive for fatigue.   Neurological:  Negative for seizures.   Psychiatric/Behavioral:  Positive for depressed mood and stress. Negative for agitation, behavioral problems, decreased concentration, dysphoric mood, hallucinations, self-injury, sleep disturbance, suicidal ideas and negative for hyperactivity. The patient is nervous/anxious.          Physical Exam:   Physical Exam  Constitutional:       Appearance: Normal appearance.   Pulmonary:      Effort: Pulmonary effort is normal.   Musculoskeletal:      Cervical back: Normal range of motion.   Neurological:       Mental Status: He is alert.   Psychiatric:         Attention and Perception: He is attentive.         Mood and Affect: Affect normal. Mood is depressed.         Speech: Speech normal.         Behavior: Behavior normal. Behavior is cooperative.         Thought Content: Thought content normal.         Cognition and Memory: Cognition and memory normal.         Judgment: Judgment normal.         Vitals:  There were no vitals taken for this visit. There is no height or weight on file to calculate BMI.  Due to extenuating circumstances and possible current health risks associated with the patient being present in a clinical setting (with current health restrictions in place in regards to possible COVID 19 transmission/exposure), the patient was seen remotely today via a [employer] Video Visit through [employer].  Unable to obtain vital signs due to nature of remote visit.      Last 3 Blood Pressure Readings:  BP Readings from Last 3 Encounters:   04/17/23 128/80       Mental Status Exam:   Hygiene:   good  Cooperation:  Cooperative  Eye Contact:  Good  Psychomotor Behavior:  Appropriate  Affect:  Full range  Mood: anxious  Hopelessness: Denies  Speech:  Normal  Thought Process:  Linear  Thought Content:  Normal  Suicidal:  None  Homicidal:  None  Hallucinations:  None  Delusion:  None  Memory:  Intact  Orientation:  Person, Place, Time, and Situation  Reliability:  good  Insight:  Good  Judgement:  Good  Impulse Control:  Fair  Physical/Medical Issues:  Yes see past medical history        Lab Results:   No visits with results within 3 Month(s) from this visit.   Latest known visit with results is:   Lab on 04/17/2023   Component Date Value Ref Range Status    Glucose 04/17/2023 81  65 - 99 mg/dL Final    BUN 04/17/2023 17  6 - 20 mg/dL Final    Creatinine 04/17/2023 1.04  0.76 - 1.27 mg/dL Final    Sodium 04/17/2023 141  136 - 145 mmol/L Final    Potassium 04/17/2023 4.3  3.5 - 5.2 mmol/L Final    Chloride 04/17/2023 106  98 -  107 mmol/L Final    CO2 04/17/2023 25.0  22.0 - 29.0 mmol/L Final    Calcium 04/17/2023 9.1  8.6 - 10.5 mg/dL Final    Total Protein 04/17/2023 7.1  6.0 - 8.5 g/dL Final    Albumin 04/17/2023 4.6  3.5 - 5.2 g/dL Final    ALT (SGPT) 04/17/2023 51 (H)  1 - 41 U/L Final    AST (SGOT) 04/17/2023 27  1 - 40 U/L Final    Alkaline Phosphatase 04/17/2023 67  39 - 117 U/L Final    Total Bilirubin 04/17/2023 0.5  0.0 - 1.2 mg/dL Final    Globulin 04/17/2023 2.5  gm/dL Final    A/G Ratio 04/17/2023 1.8  g/dL Final    BUN/Creatinine Ratio 04/17/2023 16.3  7.0 - 25.0 Final    Anion Gap 04/17/2023 10.0  5.0 - 15.0 mmol/L Final    eGFR 04/17/2023 101.6  >60.0 mL/min/1.73 Final    Total Cholesterol 04/17/2023 253 (H)  0 - 200 mg/dL Final    Triglycerides 04/17/2023 125  0 - 150 mg/dL Final    HDL Cholesterol 04/17/2023 73 (H)  40 - 60 mg/dL Final    LDL Cholesterol  04/17/2023 158 (H)  0 - 100 mg/dL Final    VLDL Cholesterol 04/17/2023 22  5 - 40 mg/dL Final    LDL/HDL Ratio 04/17/2023 2.12   Final    TSH 04/17/2023 1.470  0.270 - 4.200 uIU/mL Final    Free T4 04/17/2023 1.19  0.93 - 1.70 ng/dL Final    Vitamin B-12 04/17/2023 439  211 - 946 pg/mL Final    25 Hydroxy, Vitamin D 04/17/2023 69.4  30.0 - 100.0 ng/ml Final    WBC 04/17/2023 6.11  3.40 - 10.80 10*3/mm3 Final    RBC 04/17/2023 5.79  4.14 - 5.80 10*6/mm3 Final    Hemoglobin 04/17/2023 14.8  13.0 - 17.7 g/dL Final    Hematocrit 04/17/2023 44.8  37.5 - 51.0 % Final    MCV 04/17/2023 77.4 (L)  79.0 - 97.0 fL Final    MCH 04/17/2023 25.6 (L)  26.6 - 33.0 pg Final    MCHC 04/17/2023 33.0  31.5 - 35.7 g/dL Final    RDW 04/17/2023 13.2  12.3 - 15.4 % Final    RDW-SD 04/17/2023 36.8 (L)  37.0 - 54.0 fl Final    MPV 04/17/2023 10.3  6.0 - 12.0 fL Final    Platelets 04/17/2023 168  140 - 450 10*3/mm3 Final    Neutrophil % 04/17/2023 52.9  42.7 - 76.0 % Final    Lymphocyte % 04/17/2023 39.1  19.6 - 45.3 % Final    Monocyte % 04/17/2023 7.2  5.0 - 12.0 % Final    Eosinophil %  04/17/2023 0.3  0.3 - 6.2 % Final    Basophil % 04/17/2023 0.2  0.0 - 1.5 % Final    Immature Grans % 04/17/2023 0.3  0.0 - 0.5 % Final    Neutrophils, Absolute 04/17/2023 3.23  1.70 - 7.00 10*3/mm3 Final    Lymphocytes, Absolute 04/17/2023 2.39  0.70 - 3.10 10*3/mm3 Final    Monocytes, Absolute 04/17/2023 0.44  0.10 - 0.90 10*3/mm3 Final    Eosinophils, Absolute 04/17/2023 0.02  0.00 - 0.40 10*3/mm3 Final    Basophils, Absolute 04/17/2023 0.01  0.00 - 0.20 10*3/mm3 Final    Immature Grans, Absolute 04/17/2023 0.02  0.00 - 0.05 10*3/mm3 Final    nRBC 04/17/2023 0.0  0.0 - 0.2 /100 WBC Final         Assessment & Plan   Problems Addressed this Visit    None  Visit Diagnoses       ADHD (attention deficit hyperactivity disorder), inattentive type    -  Primary    Relevant Medications    busPIRone (BUSPAR) 30 MG tablet    Brexpiprazole (Rexulti) 4 MG tablet    hydrOXYzine (ATARAX) 25 MG tablet    buPROPion XL (Wellbutrin XL) 300 MG 24 hr tablet    buPROPion XL (Wellbutrin XL) 150 MG 24 hr tablet    atomoxetine (Strattera) 40 MG capsule    Generalized anxiety disorder        Relevant Medications    busPIRone (BUSPAR) 30 MG tablet    Brexpiprazole (Rexulti) 4 MG tablet    hydrOXYzine (ATARAX) 25 MG tablet    buPROPion XL (Wellbutrin XL) 300 MG 24 hr tablet    buPROPion XL (Wellbutrin XL) 150 MG 24 hr tablet    cloNIDine (CATAPRES) 0.2 MG tablet    atomoxetine (Strattera) 40 MG capsule    Nightmares        Relevant Medications    busPIRone (BUSPAR) 30 MG tablet    prazosin (MINIPRESS) 5 MG capsule    Brexpiprazole (Rexulti) 4 MG tablet    hydrOXYzine (ATARAX) 25 MG tablet    buPROPion XL (Wellbutrin XL) 300 MG 24 hr tablet    buPROPion XL (Wellbutrin XL) 150 MG 24 hr tablet    atomoxetine (Strattera) 40 MG capsule    Post traumatic stress disorder (PTSD)        Relevant Medications    busPIRone (BUSPAR) 30 MG tablet    Brexpiprazole (Rexulti) 4 MG tablet    hydrOXYzine (ATARAX) 25 MG tablet    buPROPion XL (Wellbutrin XL)  300 MG 24 hr tablet    buPROPion XL (Wellbutrin XL) 150 MG 24 hr tablet    atomoxetine (Strattera) 40 MG capsule    Bipolar II disorder        Relevant Medications    busPIRone (BUSPAR) 30 MG tablet    Brexpiprazole (Rexulti) 4 MG tablet    hydrOXYzine (ATARAX) 25 MG tablet    buPROPion XL (Wellbutrin XL) 300 MG 24 hr tablet    buPROPion XL (Wellbutrin XL) 150 MG 24 hr tablet    atomoxetine (Strattera) 40 MG capsule          Diagnoses         Codes Comments    ADHD (attention deficit hyperactivity disorder), inattentive type    -  Primary ICD-10-CM: F90.0  ICD-9-CM: 314.00     Generalized anxiety disorder     ICD-10-CM: F41.1  ICD-9-CM: 300.02     Nightmares     ICD-10-CM: F51.5  ICD-9-CM: 307.47     Post traumatic stress disorder (PTSD)     ICD-10-CM: F43.10  ICD-9-CM: 309.81     Bipolar II disorder     ICD-10-CM: F31.81  ICD-9-CM: 296.89             Visit Diagnoses:    ICD-10-CM ICD-9-CM   1. ADHD (attention deficit hyperactivity disorder), inattentive type  F90.0 314.00   2. Generalized anxiety disorder  F41.1 300.02   3. Nightmares  F51.5 307.47   4. Post traumatic stress disorder (PTSD)  F43.10 309.81   5. Bipolar II disorder  F31.81 296.89           GOALS:  Short Term Goals: Patient will be compliant with medication, and patient will have no significant medication related side effects.  Patient will be engaged in psychotherapy as indicated.  Patient will report subjective improvement of symptoms.  Long term goals: To stabilize mood and treat/improve subjective symptoms, the patient will stay out of the hospital, the patient will be at an optimal level of functioning, and the patient will take all medications as prescribed.  The patient/guardian verbalized understanding and agreement with goals that were mutually set.      TREATMENT PLAN: Continue supportive psychotherapy efforts and medications as indicated.  Pharmacological and Non-Pharmacological treatment options discussed during today's visit.  Patient/Guardian acknowledged and verbally consented with current treatment plan and was educated on the importance of compliance with treatment and follow-up appointments.      MEDICATION ISSUES:  Discussed medication options and treatment plan of prescribed medication as well as the risks, benefits, any black box warnings, and side effects including potential falls, possible impaired driving, and metabolic adversities among others. Patient is agreeable to call the office with any worsening of symptoms or onset of side effects, or if any concerns or questions arise.  The contact information for the office is made available to the patient. Patient is agreeable to call 911 or go to the nearest ER should they begin having any SI/HI, or if any urgent concerns arise. No medication side effects or related complaints today.     MEDS ORDERED DURING VISIT:  New Medications Ordered This Visit   Medications    busPIRone (BUSPAR) 30 MG tablet     Sig: Take 1 tablet by mouth 2 (Two) Times a Day.     Dispense:  60 tablet     Refill:  1    prazosin (MINIPRESS) 5 MG capsule     Sig: Take 1 capsule by mouth Every Night.     Dispense:  30 capsule     Refill:  1    Brexpiprazole (Rexulti) 4 MG tablet     Sig: Take 1 tablet by mouth Daily.     Dispense:  30 tablet     Refill:  1    hydrOXYzine (ATARAX) 25 MG tablet     Sig: Take 1 tablet by mouth 3 (Three) Times a Day As Needed for Anxiety.     Dispense:  90 tablet     Refill:  1     Disregard previous hydroxyzine prescription    buPROPion XL (Wellbutrin XL) 300 MG 24 hr tablet     Sig: Take 1 tablet by mouth Every Morning.     Dispense:  30 tablet     Refill:  1    buPROPion XL (Wellbutrin XL) 150 MG 24 hr tablet     Sig: Take 1 tablet by mouth Every Morning.     Dispense:  30 tablet     Refill:  1    cloNIDine (CATAPRES) 0.2 MG tablet     Sig: Take 1 tablet by mouth Every Night.     Dispense:  30 tablet     Refill:  1    atomoxetine (Strattera) 40 MG capsule     Sig: Take 1 capsule  by mouth 2 (Two) Times a Day. Take one capsule by mouth for one week then increase to one capsule by mouth twice daily.     Dispense:  60 capsule     Refill:  1     Plan:    - Continue Wellbutrin XL  450 mg p.o. daily.- Prescribed 300mg and 150mg tablet.   - Continue Rexulti 4 mg p.o. daily.  - Continue prazosin 5 mg p.o. nightly.  - Continue clonidine 0.2 mg p.o. nightly.  - Continue BuSpar 30 mg p.o. twice daily.  - Start Strattera 40 mg p.o. daily for 1 week then increase to 40 mg p.o. 2 times per day.  - Follow-up in 4 weeks via video visit.    Follow Up Appointment:   Return in about 4 weeks (around 1/2/2024) for Recheck.             This document has been electronically signed by PACHECO Salter  December 5, 2023 08:56 EST    Dictated Utilizing Dragon Dictation: Part of this note may be an electronic transcription/translation of spoken language to printed text using the Dragon Dictation System.

## 2023-12-14 ENCOUNTER — TELEPHONE (OUTPATIENT)
Dept: PSYCHIATRY | Facility: CLINIC | Age: 28
End: 2023-12-14
Payer: COMMERCIAL

## 2023-12-14 NOTE — TELEPHONE ENCOUNTER
Patient called requesting  to speak with doctor and see if he would be eligible for FMLA. States his job is causing an increase to anxiety. States he has been having  severe panic attacks while at work.

## 2023-12-15 ENCOUNTER — TELEPHONE (OUTPATIENT)
Dept: PSYCHIATRY | Facility: CLINIC | Age: 28
End: 2023-12-15
Payer: COMMERCIAL

## 2023-12-15 NOTE — TELEPHONE ENCOUNTER
I attempted to contact patient and patient's  answered Brandan's cell phone number listed reporting that Brandan took an entire bottle of clonidine last night and is currently being held on a 72 hour hold at [facility] ED.

## 2023-12-15 NOTE — TELEPHONE ENCOUNTER
In [facility] in [place]  called and stated doctor would have to call and request for patient  to  be transferred to [facility] for treatment. States they are affiliated with someone else and can't tranfers without Doctors request

## 2023-12-18 ENCOUNTER — TELEPHONE (OUTPATIENT)
Dept: PSYCHIATRY | Facility: CLINIC | Age: 28
End: 2023-12-18
Payer: COMMERCIAL

## 2023-12-18 NOTE — LETTER
December 19, 2023    Patient: Lloyd Espana   YOB: 1995   Date of Visit: 12/18/2023       To Whom It May Concern:    It is my medical opinion that Lloyd Espana should remain off of work for the remainder of the week 12/19/23 to 12/22/23. Brandan will be reevaluated on 12/21/23 to determine further treatment plans and recommendations.       Sincerely,    PACHECO Salter

## 2023-12-19 NOTE — TELEPHONE ENCOUNTER
I sent a letter in [employer] for the rest of the week and I will discuss further options with him at his appointment Thursday.

## 2023-12-21 ENCOUNTER — TELEMEDICINE (OUTPATIENT)
Dept: PSYCHIATRY | Facility: CLINIC | Age: 28
End: 2023-12-21
Payer: COMMERCIAL

## 2023-12-21 DIAGNOSIS — F31.81 BIPOLAR II DISORDER: ICD-10-CM

## 2023-12-21 DIAGNOSIS — F51.5 NIGHTMARES: Primary | ICD-10-CM

## 2023-12-21 DIAGNOSIS — F43.10 POST TRAUMATIC STRESS DISORDER (PTSD): ICD-10-CM

## 2023-12-21 DIAGNOSIS — F41.1 GENERALIZED ANXIETY DISORDER: ICD-10-CM

## 2023-12-21 DIAGNOSIS — F33.2 SEVERE EPISODE OF RECURRENT MAJOR DEPRESSIVE DISORDER, WITHOUT PSYCHOTIC FEATURES: ICD-10-CM

## 2023-12-21 DIAGNOSIS — F90.0 ADHD (ATTENTION DEFICIT HYPERACTIVITY DISORDER), INATTENTIVE TYPE: ICD-10-CM

## 2023-12-21 RX ORDER — CLONIDINE HYDROCHLORIDE 0.3 MG/1
0.3 TABLET ORAL NIGHTLY
Qty: 30 TABLET | Refills: 0 | Status: SHIPPED | OUTPATIENT
Start: 2023-12-21

## 2023-12-21 RX ORDER — CLONAZEPAM 0.5 MG/1
0.5 TABLET ORAL 2 TIMES DAILY PRN
Qty: 28 TABLET | Refills: 0 | Status: SHIPPED | OUTPATIENT
Start: 2023-12-21 | End: 2024-01-04

## 2023-12-21 NOTE — PROGRESS NOTES
"This provider is located at the [facility] (through [facility]), [address] [place] [address], [id number] using a secure [employer] Video Visit through [employer]. Patient is being seen remotely via telehealth at their home address in [place], and stated they are in a secure environment for this session. The patient's condition being diagnosed/treated is appropriate for telemedicine. The provider identified herself as well as her credentials.  The patient, and/or patients guardian, consent to be seen remotely, and when consent is given they understand that the consent allows for patient identifiable information to be sent to a third party as needed.   They may refuse to be seen remotely at any time. The electronic data is encrypted and password protected, and the patient and/or guardian has been advised of the potential risks to privacy not withstanding such measures.    You have chosen to receive care through a telehealth visit.  Do you consent to use a video/audio connection for your medical care today? Yes    Patient identifiers utilized: Name and date of birth.    Patient verbally confirmed consent for today's encounter:  December 21, 2023    Radha Espana is a 28 y.o. male who presents today for follow up    Chief Complaint: Anxiety, ADHD, bipolar 2       History of Present Illness:    History of Present Illness      Brandan is a 28-year-old male who presents today for a post hospitalization follow-up.  Brandan was discharged a few days ago after being admitted to [facility] ago.  Reviewed discharge summary and patient had reported taking approximately 20-30 clonidine 0.2 mg tablets in order to \"scare\" his  after an argument.  Brandan denies having any loss of consciousness or issues after ingesting the tablets.  He denies any SI/HI/AVH at this time.  He tells me that he has been very stressed due to work and feels like that is the biggest stressor in his life.  He tells " me that he is not sure if he will be able to return to work and request that I submit short-term disability paperwork to his employer.  Brandan tells me that he has been sleeping decent the past couple of nights but was previously struggling with sleep.  He tells me that his appetite is okay.  He plans on staying with family in [place] throughout the holidays and is hopeful that that will give him some relief in his anxiety.  He tells me that he is frequently having panic attacks which result in increased heart rate, nausea, sweating, shortness of breath, and extreme feelings of anxiety.  He tells me that his concentration and focus have been an issue as well.  While he was admitted to the inpatient facility he tells me that they did not have his Strattera or Rexulti available so he missed a few days of those medications.       The following portions of the patient's history were reviewed and updated as appropriate: allergies, current medications, past family history, past medical history, past social history, past surgical history and problem list.    Past Psychiatric History:  Began Treatment:This year  Diagnoses:Anxiety and bipolor disorder, ADHD  Psychiatrist: Saw Dr. Beck  During IOP  Therapist: Radha malave at the [facility]  Admission History:Denies  Medication Trials: Trileptal, hydroxyzine, Klonopin, prazosin, Wellbutrin, BuSpar, Lamictal, Rexulti, and trazodone  Self Harm: Denies  Suicide Attempts:Denies      Past Medical History:  Past Medical History:   Diagnosis Date    ADHD (attention deficit hyperactivity disorder)     Depression     PTSD (post-traumatic stress disorder)        Substance Abuse History:   Types:Denies all, including illicit  Withdrawal Symptoms:Denies  Longest Period Sober:Not Applicable   AA: Not applicable     Social History:  Social History     Socioeconomic History    Marital status:    Tobacco Use    Smoking status: Never     Passive exposure: Never    Smokeless tobacco:  Never   Vaping Use    Vaping Use: Never used   Substance and Sexual Activity    Alcohol use: Never    Drug use: Never    Sexual activity: Yes     Partners: Male     Birth control/protection: None       Family History:  Family History   Problem Relation Age of Onset    Anxiety disorder Mother     Depression Mother     Depression Father        Past Surgical History:  History reviewed. No pertinent surgical history.    Problem List:  There is no problem list on file for this patient.      Allergy:   Allergies   Allergen Reactions    Meloxicam Headache     migraines    Doxycycline Rash        Current Medications:   Current Outpatient Medications   Medication Sig Dispense Refill    atomoxetine (Strattera) 40 MG capsule Take 1 capsule by mouth 2 (Two) Times a Day. Take one capsule by mouth for one week then increase to one capsule by mouth twice daily. 60 capsule 1    Brexpiprazole (Rexulti) 4 MG tablet Take 1 tablet by mouth Daily. 30 tablet 1    buPROPion XL (Wellbutrin XL) 150 MG 24 hr tablet Take 1 tablet by mouth Every Morning. 30 tablet 1    buPROPion XL (Wellbutrin XL) 300 MG 24 hr tablet Take 1 tablet by mouth Every Morning. 30 tablet 1    busPIRone (BUSPAR) 30 MG tablet Take 1 tablet by mouth 2 (Two) Times a Day. 60 tablet 1    cholecalciferol (VITAMIN D3) 1.25 MG (54013 UT) capsule Take 1 capsule by mouth Every 7 (Seven) Days. 4 capsule 11    cloNIDine (CATAPRES) 0.2 MG tablet Take 1 tablet by mouth Every Night. 30 tablet 1    hydrOXYzine (ATARAX) 25 MG tablet Take 1 tablet by mouth 3 (Three) Times a Day As Needed for Anxiety. 90 tablet 1    ibuprofen (ADVIL,MOTRIN) 800 MG tablet Take 1 tablet by mouth Every 8 (Eight) Hours As Needed. for pain      omeprazole (priLOSEC) 40 MG capsule       prazosin (MINIPRESS) 5 MG capsule Take 1 capsule by mouth Every Night. 30 capsule 1     No current facility-administered medications for this visit.       Review of Systems:    Review of Systems   Constitutional:  Positive for  fatigue.   Respiratory: Negative.     Cardiovascular: Negative.    Neurological:  Negative for seizures.   Psychiatric/Behavioral:  Positive for decreased concentration, sleep disturbance, depressed mood and stress. Negative for agitation, behavioral problems, dysphoric mood, hallucinations, self-injury, suicidal ideas and negative for hyperactivity. The patient is nervous/anxious.          Physical Exam:   Physical Exam  Constitutional:       Appearance: Normal appearance.   Pulmonary:      Effort: Pulmonary effort is normal.   Musculoskeletal:      Cervical back: Normal range of motion.   Neurological:      General: No focal deficit present.      Mental Status: He is alert and oriented to person, place, and time. Mental status is at baseline.   Psychiatric:         Attention and Perception: He is inattentive.         Mood and Affect: Affect normal. Mood is anxious and depressed.         Speech: Speech normal.         Behavior: Behavior normal. Behavior is cooperative.         Thought Content: Thought content normal.         Cognition and Memory: Cognition and memory normal.         Judgment: Judgment is impulsive.         Vitals:  There were no vitals taken for this visit. There is no height or weight on file to calculate BMI.  Due to extenuating circumstances and possible current health risks associated with the patient being present in a clinical setting (with current health restrictions in place in regards to possible COVID 19 transmission/exposure), the patient was seen remotely today via a MyChart Video Visit through [employer].  Unable to obtain vital signs due to nature of remote visit.      Last 3 Blood Pressure Readings:  BP Readings from Last 3 Encounters:   04/17/23 128/80       Mental Status Exam:   Hygiene:   good  Cooperation:  Cooperative  Eye Contact:  Good  Psychomotor Behavior:  Appropriate  Affect:  Full range  Mood: anxious  Hopelessness: Denies  Speech:  Normal  Thought Process:  Linear  Thought  Content:  Normal  Suicidal:  None  Homicidal:  None  Hallucinations:  None  Delusion:  None  Memory:  Intact  Orientation:  Person, Place, Time, and Situation  Reliability:  good  Insight:  Good  Judgement:  Good  Impulse Control:  Fair  Physical/Medical Issues:  Yes see past medical history        Lab Results:   No visits with results within 3 Month(s) from this visit.   Latest known visit with results is:   Lab on 04/17/2023   Component Date Value Ref Range Status    Glucose 04/17/2023 81  65 - 99 mg/dL Final    BUN 04/17/2023 17  6 - 20 mg/dL Final    Creatinine 04/17/2023 1.04  0.76 - 1.27 mg/dL Final    Sodium 04/17/2023 141  136 - 145 mmol/L Final    Potassium 04/17/2023 4.3  3.5 - 5.2 mmol/L Final    Chloride 04/17/2023 106  98 - 107 mmol/L Final    CO2 04/17/2023 25.0  22.0 - 29.0 mmol/L Final    Calcium 04/17/2023 9.1  8.6 - 10.5 mg/dL Final    Total Protein 04/17/2023 7.1  6.0 - 8.5 g/dL Final    Albumin 04/17/2023 4.6  3.5 - 5.2 g/dL Final    ALT (SGPT) 04/17/2023 51 (H)  1 - 41 U/L Final    AST (SGOT) 04/17/2023 27  1 - 40 U/L Final    Alkaline Phosphatase 04/17/2023 67  39 - 117 U/L Final    Total Bilirubin 04/17/2023 0.5  0.0 - 1.2 mg/dL Final    Globulin 04/17/2023 2.5  gm/dL Final    A/G Ratio 04/17/2023 1.8  g/dL Final    BUN/Creatinine Ratio 04/17/2023 16.3  7.0 - 25.0 Final    Anion Gap 04/17/2023 10.0  5.0 - 15.0 mmol/L Final    eGFR 04/17/2023 101.6  >60.0 mL/min/1.73 Final    Total Cholesterol 04/17/2023 253 (H)  0 - 200 mg/dL Final    Triglycerides 04/17/2023 125  0 - 150 mg/dL Final    HDL Cholesterol 04/17/2023 73 (H)  40 - 60 mg/dL Final    LDL Cholesterol  04/17/2023 158 (H)  0 - 100 mg/dL Final    VLDL Cholesterol 04/17/2023 22  5 - 40 mg/dL Final    LDL/HDL Ratio 04/17/2023 2.12   Final    TSH 04/17/2023 1.470  0.270 - 4.200 uIU/mL Final    Free T4 04/17/2023 1.19  0.93 - 1.70 ng/dL Final    Vitamin B-12 04/17/2023 439  211 - 946 pg/mL Final    25 Hydroxy, Vitamin D 04/17/2023 69.4  30.0  - 100.0 ng/ml Final    WBC 04/17/2023 6.11  3.40 - 10.80 10*3/mm3 Final    RBC 04/17/2023 5.79  4.14 - 5.80 10*6/mm3 Final    Hemoglobin 04/17/2023 14.8  13.0 - 17.7 g/dL Final    Hematocrit 04/17/2023 44.8  37.5 - 51.0 % Final    MCV 04/17/2023 77.4 (L)  79.0 - 97.0 fL Final    MCH 04/17/2023 25.6 (L)  26.6 - 33.0 pg Final    MCHC 04/17/2023 33.0  31.5 - 35.7 g/dL Final    RDW 04/17/2023 13.2  12.3 - 15.4 % Final    RDW-SD 04/17/2023 36.8 (L)  37.0 - 54.0 fl Final    MPV 04/17/2023 10.3  6.0 - 12.0 fL Final    Platelets 04/17/2023 168  140 - 450 10*3/mm3 Final    Neutrophil % 04/17/2023 52.9  42.7 - 76.0 % Final    Lymphocyte % 04/17/2023 39.1  19.6 - 45.3 % Final    Monocyte % 04/17/2023 7.2  5.0 - 12.0 % Final    Eosinophil % 04/17/2023 0.3  0.3 - 6.2 % Final    Basophil % 04/17/2023 0.2  0.0 - 1.5 % Final    Immature Grans % 04/17/2023 0.3  0.0 - 0.5 % Final    Neutrophils, Absolute 04/17/2023 3.23  1.70 - 7.00 10*3/mm3 Final    Lymphocytes, Absolute 04/17/2023 2.39  0.70 - 3.10 10*3/mm3 Final    Monocytes, Absolute 04/17/2023 0.44  0.10 - 0.90 10*3/mm3 Final    Eosinophils, Absolute 04/17/2023 0.02  0.00 - 0.40 10*3/mm3 Final    Basophils, Absolute 04/17/2023 0.01  0.00 - 0.20 10*3/mm3 Final    Immature Grans, Absolute 04/17/2023 0.02  0.00 - 0.05 10*3/mm3 Final    nRBC 04/17/2023 0.0  0.0 - 0.2 /100 WBC Final         Assessment & Plan   Problems Addressed this Visit    None  Visit Diagnoses       Nightmares    -  Primary    Generalized anxiety disorder        ADHD (attention deficit hyperactivity disorder), inattentive type        Post traumatic stress disorder (PTSD)        Bipolar II disorder        Severe episode of recurrent major depressive disorder, without psychotic features              Diagnoses         Codes Comments    Nightmares    -  Primary ICD-10-CM: F51.5  ICD-9-CM: 307.47     Generalized anxiety disorder     ICD-10-CM: F41.1  ICD-9-CM: 300.02     ADHD (attention deficit hyperactivity  disorder), inattentive type     ICD-10-CM: F90.0  ICD-9-CM: 314.00     Post traumatic stress disorder (PTSD)     ICD-10-CM: F43.10  ICD-9-CM: 309.81     Bipolar II disorder     ICD-10-CM: F31.81  ICD-9-CM: 296.89     Severe episode of recurrent major depressive disorder, without psychotic features     ICD-10-CM: F33.2  ICD-9-CM: 296.33             Visit Diagnoses:    ICD-10-CM ICD-9-CM   1. Nightmares  F51.5 307.47   2. Generalized anxiety disorder  F41.1 300.02   3. ADHD (attention deficit hyperactivity disorder), inattentive type  F90.0 314.00   4. Post traumatic stress disorder (PTSD)  F43.10 309.81   5. Bipolar II disorder  F31.81 296.89   6. Severe episode of recurrent major depressive disorder, without psychotic features  F33.2 296.33             GOALS:  Short Term Goals: Patient will be compliant with medication, and patient will have no significant medication related side effects.  Patient will be engaged in psychotherapy as indicated.  Patient will report subjective improvement of symptoms.  Long term goals: To stabilize mood and treat/improve subjective symptoms, the patient will stay out of the hospital, the patient will be at an optimal level of functioning, and the patient will take all medications as prescribed.  The patient/guardian verbalized understanding and agreement with goals that were mutually set.      TREATMENT PLAN: Continue supportive psychotherapy efforts and medications as indicated.  Pharmacological and Non-Pharmacological treatment options discussed during today's visit. Patient/Guardian acknowledged and verbally consented with current treatment plan and was educated on the importance of compliance with treatment and follow-up appointments.      MEDICATION ISSUES:  Discussed medication options and treatment plan of prescribed medication as well as the risks, benefits, any black box warnings, and side effects including potential falls, possible impaired driving, and metabolic adversities  among others. Patient is agreeable to call the office with any worsening of symptoms or onset of side effects, or if any concerns or questions arise.  The contact information for the office is made available to the patient. Patient is agreeable to call 911 or go to the nearest ER should they begin having any SI/HI, or if any urgent concerns arise. No medication side effects or related complaints today.     MEDS ORDERED DURING VISIT:  No orders of the defined types were placed in this encounter.    Plan:    - Continue Wellbutrin XL  450 mg p.o. daily.- Prescribed 300mg and 150mg tablet.   - Continue Rexulti 4 mg p.o. daily.  - Continue prazosin 5 mg p.o. nightly.  - Increase clonidine 0.3 mg p.o. nightly.  - Continue BuSpar 30 mg p.o. twice daily.  -Continue Strattera 80 mg by mouth daily.  - Start Klonopin 0.5 mg by mouth 2 times daily for anxiety.  Patient has been prescribed this previously and tells me that he feels like it worked well to control his anxiety.  - Sonny is reviewed on this date and is appropriate.  - Follow-up in 2 weeks via video visit.    Follow Up Appointment:   Return in about 2 weeks (around 1/4/2024).             This document has been electronically signed by PACHECO Salter  December 21, 2023 08:53 EST    Dictated Utilizing Dragon Dictation: Part of this note may be an electronic transcription/translation of spoken language to printed text using the Dragon Dictation System.

## 2023-12-27 ENCOUNTER — TELEPHONE (OUTPATIENT)
Dept: PSYCHIATRY | Facility: CLINIC | Age: 28
End: 2023-12-27
Payer: COMMERCIAL

## 2023-12-29 ENCOUNTER — OFFICE VISIT (OUTPATIENT)
Dept: PSYCHIATRY | Facility: CLINIC | Age: 28
End: 2023-12-29
Payer: COMMERCIAL

## 2023-12-29 DIAGNOSIS — F90.2 ATTENTION DEFICIT HYPERACTIVITY DISORDER, COMBINED TYPE: ICD-10-CM

## 2023-12-29 DIAGNOSIS — F41.9 SEVERE ANXIETY: Primary | ICD-10-CM

## 2023-12-29 DIAGNOSIS — T50.902A SUICIDE ATTEMPT BY DRUG OVERDOSE: ICD-10-CM

## 2023-12-29 DIAGNOSIS — F43.10 POST TRAUMATIC STRESS DISORDER (PTSD): ICD-10-CM

## 2023-12-29 PROCEDURE — 90791 PSYCH DIAGNOSTIC EVALUATION: CPT | Performed by: COUNSELOR

## 2024-01-04 NOTE — PROGRESS NOTES
"Subjective   Lloyd Espana is a 28 y.o. male who is here today,12/29/2023 for initial behavioral health evaluation starting at 8 AM and ending at 9 AM.    Chief Complaint:    Patient rated depression at 0 and anxiety at 10.  He denies current suicidal ideation.    History of Present Illness:  \"I took 29 clonidine 0.2 mg.  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  We usually work separately but that night we were together.  He drove and I got the orders.  I met him in [place].  I saw a vape in his car and realized I grabbed it.  He said he needed it to cope and we argued and we both said deeply cutting things.\"    \"I graduated from high school and had some college at [employer].  I never finished no degree but worked on general education.  My goal was computer science but it went way over my head.  Now I am on a leave of absence and I work at [email or web address] from home.\"    \"We will be moving to [place] the end of January and February.  We have lived in [place] for a year.  We will move on my paternal grandparents property and live in a camper to care for them.  My Poppa is age 82 and still works in a department store that he has worked for them for 20 years.  He is a , the only meal employee.  My grandmother is 80 years old, in good health but losing her balance..  My dad is their only child and he has alienated himself from everyone when he  his .  I was the last when he cut off.\"    \"I still struggle with abandonment issues, but I have come to peace.  He is not coming back.\"  \"I think my grandfather is excited.  He does not agree with me being marinelli and I have not told him I am .  My mother is excited.\"    Patient has 4 good friends in [place], \"we keep up our relationships.\"    \"I have a phone interview with Richard in [place].    Past Psych History:  Patient had a few sessions with Radha in this clinic.  \"She was booked out and I did not want " "to wait that long.\"  Patient was in the [facility] once from December 15 to 19, \"it was helpful after my suicide attempt.\"    Substance Abuse:  \"I drink but never used it to cope.  I drink 1 glass once a year.\"  Patient has been vaping nicotine for 6 months.  He denies ever using marijuana or any other illegal substance.    Social History:   Patient's father is 57 years old, \"he is not in my life anymore.  When I was 19, he did not want to be in my life and I assume it is because of his .  Before that he was emotionally abusive and blamed me for his frequent behavior.  He has worked for [employer] as an  for decades.  Dad came out after the divorce.  I was 3 years old when they .\"  Patient rated his emotional connection with his father at 2/10.\"    Patient's mother is age 52, \"she has been a  since 2003.  She enjoys her work and is very protective.  She has a temper.  She is only 4 foot 10 inches, but it she acts like she is 6 feet 4 inches when she is mad about things I do like buying a new car, etc.  She lives in [place], we do not talk often but more now.\"  Patient rated his emotional connection with his mother at 6/10.    \"My mother has remarried.  My stepfather Manuel is 60 years old.  My mom cheated on dad with Manuel.  He was an A-hole, but when dad walked out, he stepped up.  We rarely talk.  He is a retired prosecutor and works as an  doing house closings . He was  previously, has 2 kids and they have 2 together.\"  Patient rates his relationship with Manuel at 3/10.     Patient has stepsiblings: Catherine, 6 years older, lives with her mother and Azeem, age 30, \"I am not close to them.  If we needed help, we would be there.  I am in the middle and mom and Manuel have Ty, age 16 and Dana, age 15.  \"Ty is in no at all, obnoxious.  We do not talk much to each other.  When I was hospitalized Dana texted me that she loved me and I was the best big " "brother, it made me cry.\"    Patient is  to Mars, age 40, \"we met on a Facebook dating site.  We have been together since September 2022 and  since March 2023.  \"He has 4 or 5 adopted children, most do not have anything to do with him. Haroon, age 11, has severe ADHD.  Our relationship is strained, but Mars says he adores or loves me, but I fear he hates me.  Now that we are moving to [place] he will stay here with his mother.\"  \"He also has Juliann, Miriam and Zamzam, they are all in their 20s.  I have met Juliann, but she ignores him.\" \"Mars is loving, he does anything for me that he can.  He works as an environmental , a , at [employer] in [place].  He will be working as a  in a hospital when we move.\"      Visit Diagnoses:    ICD-10-CM ICD-9-CM   1. Severe anxiety  F41.9 300.00   2. Suicide attempt by drug overdose  T50.902A 977.9     E950.5   3. Attention deficit hyperactivity disorder, combined type  F90.2 314.01   4. Post traumatic stress disorder (PTSD)  F43.10 309.81         Family Psychiatric History:  family history includes Anxiety disorder in his mother; Depression in his father and mother.    Medical/Surgical History:  Past Medical History:   Diagnosis Date    ADHD (attention deficit hyperactivity disorder)     Depression     PTSD (post-traumatic stress disorder)      History reviewed. No pertinent surgical history.    Allergies   Allergen Reactions    Meloxicam Headache     migraines    Doxycycline Rash           Current Medications:   Current Outpatient Medications   Medication Sig Dispense Refill    atomoxetine (Strattera) 40 MG capsule Take 1 capsule by mouth 2 (Two) Times a Day. Take one capsule by mouth for one week then increase to one capsule by mouth twice daily. 60 capsule 1    Brexpiprazole (Rexulti) 4 MG tablet Take 1 tablet by mouth Daily. 30 tablet 1    buPROPion XL (Wellbutrin XL) 150 MG 24 hr tablet Take 1 tablet by mouth Every " Morning. 30 tablet 1    buPROPion XL (Wellbutrin XL) 300 MG 24 hr tablet Take 1 tablet by mouth Every Morning. 30 tablet 1    busPIRone (BUSPAR) 30 MG tablet Take 1 tablet by mouth 2 (Two) Times a Day. 60 tablet 1    cholecalciferol (VITAMIN D3) 1.25 MG (87844 UT) capsule Take 1 capsule by mouth Every 7 (Seven) Days. 4 capsule 11    clonazePAM (KlonoPIN) 0.5 MG tablet Take 1 tablet by mouth 2 (Two) Times a Day As Needed for Anxiety for up to 14 days. 28 tablet 0    cloNIDine (CATAPRES) 0.3 MG tablet Take 1 tablet by mouth Every Night. 30 tablet 0    hydrOXYzine (ATARAX) 25 MG tablet Take 1 tablet by mouth 3 (Three) Times a Day As Needed for Anxiety. 90 tablet 1    ibuprofen (ADVIL,MOTRIN) 800 MG tablet Take 1 tablet by mouth Every 8 (Eight) Hours As Needed. for pain      omeprazole (priLOSEC) 40 MG capsule       prazosin (MINIPRESS) 5 MG capsule Take 1 capsule by mouth Every Night. 30 capsule 1     No current facility-administered medications for this visit.         Objective   There were no vitals taken for this visit.    Mental Status Exam:   Hygiene:   good  Cooperation:  Cooperative  Eye Contact:  Good  Psychomotor Behavior:  Appropriate  Affect:  Appropriate  Hopelessness: Denies  Speech:  Normal  Thought Process:  Goal directed and Linear  Thought Content:  Normal  Suicidal:  None  Homicidal:  None  Hallucinations:  None  Delusion:  None  Memory:  Intact  Orientation:  Person, Place, Time, and Situation  Reliability:  good  Insight:  Good  Judgement:  Good  Impulse Control:  Fair  Physical/Medical Issues:   None reported      DIAGNOSTIC IMPRESSION:   Encounter Diagnoses   Name Primary?    Severe anxiety Yes    Suicide attempt by drug overdose     Attention deficit hyperactivity disorder, combined type     Post traumatic stress disorder (PTSD)        PROBLEM LIST:   Patient lacks adequate coping skills, has had a recent suicide/overdose attempt    STRENGTHS:   Patient appears motivated for treatment is  currently engaged and compliant.    WEAKNESSES:  Ineffective coping skills, disease management      SHORT-TERM GOALS: Patient will be compliant with clinic appointments.  Patient will be engaged in therapy, medication compliant with minimal side effects. Patient  will report decreased frequency and severity of symptoms.     LONG-TERM GOALS: Patient will have minimal symptoms of  with continued medication management. Patient will be compliant with treatment and appointments.       PLAN:   Patient will continue with individual outpatient treatment and pharmacotherapy as scheduled.     Return in about 1 week (around 1/5/2024).     The patient was instructed to call clinic as needed or go to ER if in crisis.          This document electronically signed by Sudha Bennett, ROOSEVELTC, NCC, CSAT    Sudha Bennett  Licensed Professional Clinical Counselor  Certified Sexual Addiction Therapist